# Patient Record
Sex: FEMALE | Race: WHITE | NOT HISPANIC OR LATINO | Employment: UNEMPLOYED | URBAN - METROPOLITAN AREA
[De-identification: names, ages, dates, MRNs, and addresses within clinical notes are randomized per-mention and may not be internally consistent; named-entity substitution may affect disease eponyms.]

---

## 2018-02-19 ENCOUNTER — HOSPITAL ENCOUNTER (EMERGENCY)
Facility: CLINIC | Age: 28
Discharge: HOME | End: 2018-02-19
Attending: PHYSICIAN ASSISTANT | Admitting: PHYSICIAN ASSISTANT
Payer: MEDICAID

## 2018-02-19 VITALS
TEMPERATURE: 98.5 F | BODY MASS INDEX: 20.3 KG/M2 | OXYGEN SATURATION: 94 % | DIASTOLIC BLOOD PRESSURE: 84 MMHG | HEART RATE: 80 BPM | WEIGHT: 122 LBS | RESPIRATION RATE: 14 BRPM | SYSTOLIC BLOOD PRESSURE: 130 MMHG

## 2018-02-19 DIAGNOSIS — H73.012 BULLOUS MYRINGITIS OF LEFT EAR: ICD-10-CM

## 2018-02-19 PROCEDURE — 99213 OFFICE O/P EST LOW 20 MIN: CPT | Performed by: PHYSICIAN ASSISTANT

## 2018-02-19 PROCEDURE — G0463 HOSPITAL OUTPT CLINIC VISIT: HCPCS

## 2018-02-19 RX ORDER — OFLOXACIN 3 MG/ML
10 SOLUTION AURICULAR (OTIC) DAILY
Qty: 4 ML | Refills: 0 | Status: SHIPPED | OUTPATIENT
Start: 2018-02-19 | End: 2018-02-26

## 2018-02-19 RX ORDER — AZITHROMYCIN 250 MG/1
TABLET, FILM COATED ORAL
Qty: 6 TABLET | Refills: 0 | Status: SHIPPED | OUTPATIENT
Start: 2018-02-19 | End: 2018-02-24

## 2018-02-19 RX ORDER — NAPROXEN 500 MG/1
500 TABLET ORAL 2 TIMES DAILY PRN
Qty: 16 TABLET | Refills: 0 | Status: SHIPPED | OUTPATIENT
Start: 2018-02-19 | End: 2019-12-29

## 2018-02-19 ASSESSMENT — ENCOUNTER SYMPTOMS
SORE THROAT: 1
COUGH: 0
RHINORRHEA: 1
WOUND: 0
WEAKNESS: 0
NUMBNESS: 0
DIZZINESS: 0
WHEEZING: 0
APPETITE CHANGE: 0
LIGHT-HEADEDNESS: 0
ABDOMINAL PAIN: 0
DIARRHEA: 0
VOMITING: 0
HEADACHES: 0
TROUBLE SWALLOWING: 0
FEVER: 0
ACTIVITY CHANGE: 0
VOICE CHANGE: 0
NAUSEA: 0

## 2018-02-19 NOTE — ED AVS SNAPSHOT
[facility] Emergency Department    [address] [address]    Phone:  [phone]    Fax:  [phone]                                       Harrison Raymond   MRN: [id number]    Department:  [facility] Emergency Department   Date of Visit:  2/19/2018           Patient Information     Date Of Birth          1990        Your diagnoses for this visit were:     Bullous myringitis of left ear        You were seen by Renae Cronin PA-C.      Follow-up Information     Follow up with No Ref-Primary, Physician.    Why:  As needed, If symptoms worsen        Discharge Instructions       Use medication as directed.    May use acetaminophen prn.  Follow up with PCP for recheck in 2 days, return sooner if symptoms worsen or change.    Increase fluids, rest, warm compress to ears.     Patient voiced understanding of instructions given.         24 Hour Appointment Hotline       To make an appointment at any [facility], call [phone] ([phone]). If you don't have a family doctor or clinic, we will help you find one. [place] clinics are conveniently located to serve the needs of you and your family.             Review of your medicines      START taking        Dose / Directions Last dose taken    azithromycin 250 MG tablet   Commonly known as:  ZITHROMAX Z-LENNOX   Quantity:  6 tablet        Two tablets on the first day, then one tablet daily for the next 4 days   Refills:  0        naproxen 500 MG tablet   Commonly known as:  NAPROSYN   Dose:  500 mg   Quantity:  16 tablet        Take 1 tablet (500 mg) by mouth 2 times daily as needed for moderate pain   Refills:  0        ofloxacin 0.3 % otic solution   Commonly known as:  FLOXIN   Dose:  10 drop   Quantity:  4 mL        Place 10 drops Into the left ear daily for 7 days   Refills:  0          Our records show that you are taking the medicines listed below. If these are incorrect, please call your family doctor or  clinic.        Dose / Directions Last dose taken    DEPO-PROVERA 150 MG/ML injection   Dose:  150 mg   Quantity:  0.9 mL   Generic drug:  medroxyPROGESTERone        Inject 1 mL (150 mg) into the muscle every 3 months   Refills:  9        ibuprofen 400-800 mg tablet   Commonly known as:  ADVIL,MOTRIN   Dose:  400-800 mg   Quantity:  90 tablet        Take 1-2 tablets (400-800 mg) by mouth every 6 hours as needed for other (cramping)   Refills:  0                Prescriptions were sent or printed at these locations (3 Prescriptions)                   [facility] [place], [place] - [address] [facility] [address]   [address] [address]    Telephone:  [phone]   Fax:  [phone]   Hours:                  E-Prescribed (2 of 3)         azithromycin (ZITHROMAX Z-LENNOX) 250 MG tablet               naproxen (NAPROSYN) 500 MG tablet                 Printed at Department/Unit printer (1 of 3)         ofloxacin (FLOXIN) 0.3 % otic solution                Orders Needing Specimen Collection     None      Pending Results     No orders found from 2/17/2018 to 2/20/2018.            Pending Culture Results     No orders found from 2/17/2018 to 2/20/2018.            Pending Results Instructions     If you had any lab results that were not finalized at the time of your Discharge, you can call the ED Lab Result RN at [phone]. You will be contacted by this team for any positive Lab results or changes in treatment. The nurses are available 7 days a week from 10A to 6:30P.  You can leave a message 24 hours per day and they will return your call.        Test Results From Your Hospital Stay               Thank you for choosing [facility]       Thank you for choosing [facility] for your care. Our goal is always to provide you with excellent care. Hearing back from our patients is one way we can continue to improve our services. Please take a few minutes to complete the written survey that you may receive in the mail after  "you visit with us. Thank you!        [employer] Information     [employer] lets you send messages to your doctor, view your test results, renew your prescriptions, schedule appointments and more. To sign up, go to [email or web address] . Click on \"Log in\" on the left side of the screen, which will take you to the Welcome page. Then click on \"Sign up Now\" on the right side of the page.     You will be asked to enter the access code listed below, as well as some personal information. Please follow the directions to create your username and password.     Your access code is: [email or web address]  Expires: 2018  6:12 PM     Your access code will  in 90 days. If you need help or a new code, please call your [place] clinic or [phone].        Care EveryWhere ID     This is your Care EveryWhere ID. This could be used by other organizations to access your [place] medical records  [id number]        Equal Access to Services     OMAR LUCIA : Hadii albert mcclelland hadasho Soargeliaali, waaxda luqadaha, qaybta kaalmada adeegyajosie, eveline siddiqui . So [facility] [phone].    ATENCIÓN: Si habla español, tiene a lima disposición servicios gratuitos de asistencia lingüística. Llame al [phone].    We comply with applicable federal civil rights laws and [place] laws. We do not discriminate on the basis of race, color, national origin, age, disability, sex, sexual orientation, or gender identity.            After Visit Summary       This is your record. Keep this with you and show to your community pharmacist(s) and doctor(s) at your next visit.                  "

## 2018-02-19 NOTE — ED AVS SNAPSHOT
[facility] Emergency Department    [address] [address]    Phone:  [phone]    Fax:  [phone]                                       Harrison Raymond   MRN: [id number]    Department:  [facility] Emergency Department   Date of Visit:  2/19/2018           After Visit Summary Signature Page     I have received my discharge instructions, and my questions have been answered. I have discussed any challenges I see with this plan with the nurse or doctor.    ..........................................................................................................................................  Patient/Patient Representative Signature      ..........................................................................................................................................  Patient Representative Print Name and Relationship to Patient    ..................................................               ................................................  Date                                            Time    ..........................................................................................................................................  Reviewed by Signature/Title    ...................................................              ..............................................  Date                                                            Time

## 2018-02-19 NOTE — LETTER
[facility] EMERGENCY DEPARTMENT  [address] [address]  Phone: [phone]  Fax: [phone]    February 19, 2018        Harrison Raymond  [address]  [place] [address]          To whom it may concern:    RE: Harrison Raymond    Patient was seen and treated today at our clinic.  Please excuse her from work today and patient can return to work tomorrow. Patient not to use head set in left ear for 7-10 days.     Please contact me for questions or concerns.      Sincerely,    Renae Cronin PA-C

## 2018-02-20 NOTE — DISCHARGE INSTRUCTIONS
Use medication as directed.    May use acetaminophen prn.  Follow up with PCP for recheck in 2 days, return sooner if symptoms worsen or change.    Increase fluids, rest, warm compress to ears.     Patient voiced understanding of instructions given.

## 2018-02-20 NOTE — ED PROVIDER NOTES
History     Chief Complaint   Patient presents with     Otalgia     HPI    Harrison Raymond is a 27 year old female who presents with left ear pain for 3 day(s).   Severity: mild   Additional symptoms include post-nasal drip and rhinorrhea.  Occasionally scratchy throat.   Patient states about 3 days ago she was wearing ear buds and she felt a little shock in her left ear. Patient states the ear buds did not work after that. Patient denies ringing in ears, drainage, headaches, dizziness, patient states pain was on and off in ear until today when it became constant. Patient states she was seen in [place] today but is here for a 2nd opinion. Patient states she was given prescription steroid and told to take ibuprofen and tylenol for pain.    History of recurrent otitis: No    Problem list, Medication list, Allergies, and Medical/Social/Surgical histories reviewed in [facility] and updated as appropriate.      Problem List:    Patient Active Problem List    Diagnosis Date Noted     Supervision of normal first pregnancy 10/10/2014     Priority: Medium     Tdap given 16/2015          Past Medical History:    Past Medical History:   Diagnosis Date     Chickenpox      Migraines        Past Surgical History:    Past Surgical History:   Procedure Laterality Date     NO HISTORY OF SURGERY         Family History:    Family History   Problem Relation Age of Onset     Family History Negative       Unknown/Adopted Father      unknown       Social History:  Marital Status:  Single [1]  Social History   Substance Use Topics     Smoking status: Never Smoker     Smokeless tobacco: Not on file     Alcohol use Yes      Comment: rare- quit with pregnancy        Medications:      azithromycin (ZITHROMAX Z-LENNOX) 250 MG tablet   ofloxacin (FLOXIN) 0.3 % otic solution   naproxen (NAPROSYN) 500 MG tablet   medroxyPROGESTERone (DEPO-PROVERA) 150 MG/ML injection   ibuprofen (ADVIL,MOTRIN) 400-800 mg tablet         Review of Systems    Constitutional: Negative for activity change, appetite change and fever.   HENT: Positive for congestion, ear pain, rhinorrhea and sore throat (occasional scratchy throat. ). Negative for ear discharge, trouble swallowing and voice change.         No ringing in ears or drainage from ear. No pain with touching the ear.    Respiratory: Negative for cough and wheezing.    Cardiovascular: Negative for chest pain.   Gastrointestinal: Negative for abdominal pain, diarrhea, nausea and vomiting.   Skin: Negative for rash and wound.   Neurological: Negative for dizziness, weakness, light-headedness, numbness and headaches.   All other systems reviewed and are negative.      Physical Exam   BP: 130/84  Pulse: 80  Temp: 98.5  F (36.9  C)  Resp: 14  Weight: 55.3 kg (122 lb)  SpO2: 94 %      Physical Exam     /84  Pulse 80  Temp 98.5  F (36.9  C)  Resp 14  Wt 55.3 kg (122 lb)  SpO2 94%  BMI 20.3 kg/m2   Right TM is normal: no effusions, no erythema, and normal landmarks     The Right auditory canal is normal and without drainage, edema or erythema  Left TM is bullae present, distorted light reflex, erythematous and no perforation noted to TM.   The Left auditory canal is normal and without drainage, edema or erythema  Oropharynx exam is normal: no lesions, erythema, adenopathy or exudate.  GENERAL: no acute distress  EYES: EOMI,  PERRL, conjunctiva clear  NECK: supple, non-tender to palpation, no adenopathy noted  RESP: lungs clear to auscultation - no rales, rhonchi or wheezes  SKIN: no suspicious lesions or rashes   CV: regular rates and rhythm, normal    No results found for this or any previous visit (from the past 24 hour(s)).    ED Course     ED Course     Procedures              Critical Care time:  none               Labs Ordered and Resulted from Time of ED Arrival Up to the Time of Departure from the ED - No data to display    Assessments & Plan (with Medical Decision Making)     I have reviewed the  nursing notes.    I have reviewed the findings, diagnosis, plan and need for follow up with the patient.       Discharge Medication List as of 2/19/2018  6:14 PM      START taking these medications    Details   azithromycin (ZITHROMAX Z-LENNOX) 250 MG tablet Two tablets on the first day, then one tablet daily for the next 4 days, Disp-6 tablet, R-0, E-Prescribe      ofloxacin (FLOXIN) 0.3 % otic solution Place 10 drops Into the left ear daily for 7 days, Disp-4 mL, R-0, Local Print      naproxen (NAPROSYN) 500 MG tablet Take 1 tablet (500 mg) by mouth 2 times daily as needed for moderate pain, Disp-16 tablet, R-0, E-Prescribe             Final diagnoses:   Bullous myringitis of left ear       2/19/2018   [facility] EMERGENCY DEPARTMENT     Renae Cronin PA-C  02/19/18 3289

## 2018-12-27 ENCOUNTER — HOSPITAL ENCOUNTER (EMERGENCY)
Facility: CLINIC | Age: 28
Discharge: HOME | End: 2018-12-27
Attending: EMERGENCY MEDICINE | Admitting: EMERGENCY MEDICINE
Payer: MEDICAID

## 2018-12-27 VITALS
DIASTOLIC BLOOD PRESSURE: 74 MMHG | OXYGEN SATURATION: 99 % | RESPIRATION RATE: 16 BRPM | SYSTOLIC BLOOD PRESSURE: 116 MMHG | TEMPERATURE: 98.5 F

## 2018-12-27 DIAGNOSIS — H65.92 OME (OTITIS MEDIA WITH EFFUSION), LEFT: ICD-10-CM

## 2018-12-27 PROCEDURE — 25000132 ZZH RX MED GY IP 250 OP 250 PS 637: Performed by: EMERGENCY MEDICINE

## 2018-12-27 PROCEDURE — 99283 EMERGENCY DEPT VISIT LOW MDM: CPT

## 2018-12-27 PROCEDURE — 99283 EMERGENCY DEPT VISIT LOW MDM: CPT | Mod: Z6 | Performed by: EMERGENCY MEDICINE

## 2018-12-27 RX ORDER — SUMATRIPTAN 5 MG/1
1 SPRAY NASAL
COMMUNITY
Start: 2015-07-24 | End: 2019-12-29

## 2018-12-27 RX ORDER — EPINEPHRINE 0.3 MG/.3ML
0.3 INJECTION SUBCUTANEOUS
COMMUNITY
Start: 2017-06-10

## 2018-12-27 RX ORDER — AZITHROMYCIN 250 MG/1
500 TABLET, FILM COATED ORAL ONCE
Status: COMPLETED | OUTPATIENT
Start: 2018-12-27 | End: 2018-12-27

## 2018-12-27 RX ORDER — AZITHROMYCIN 250 MG/1
TABLET, FILM COATED ORAL
Qty: 6 TABLET | Refills: 0 | Status: SHIPPED | OUTPATIENT
Start: 2018-12-27 | End: 2019-01-01

## 2018-12-27 RX ADMIN — AZITHROMYCIN 500 MG: 250 TABLET, FILM COATED ORAL at 03:36

## 2018-12-27 NOTE — ED AVS SNAPSHOT
[facility] Emergency Department  [address] [address]  Phone:  [phone]  Fax:  [phone]                                    Harrison Raymond   MRN: [id number]    Department:  [facility] Emergency Department   Date of Visit:  12/27/2018           After Visit Summary Signature Page    I have received my discharge instructions, and my questions have been answered. I have discussed any challenges I see with this plan with the nurse or doctor.    ..........................................................................................................................................  Patient/Patient Representative Signature      ..........................................................................................................................................  Patient Representative Print Name and Relationship to Patient    ..................................................               ................................................  Date                                   Time    ..........................................................................................................................................  Reviewed by Signature/Title    ...................................................              ..............................................  Date                                               Time          22EPIC Rev 08/18

## 2018-12-27 NOTE — ED TRIAGE NOTES
Pt presents with complaints of left ear pain/pressure X 24 hours.  Pt states that she has had on/off pressure in her ears and nose for the past year.  Has not been tested for allergies.  Has not taken any meds tonight for pain.  Last ibuprofen was 24 hours ago

## 2018-12-27 NOTE — ED PROVIDER NOTES
Chief Complaint:   Chief Complaint   Patient presents with     Otalgia     Left ear pressure X 24 hours         HPI:   Harrison Raymond is a 28 year old female presenting to the ED complaining of left pain that started 1 day(s) ago.  There is associated plugged sensation on left.  There is not associated sore throat, swollen glands, fever, cough, vomiting, diarrhea, headache, nausea and abdominal discomfort.  She has tried no meds without relief of symptoms.  Temperature not measured at home.    Medications:   Current Outpatient Medications   Medication Sig Dispense Refill     azithromycin (ZITHROMAX Z-LENNOX) 250 MG tablet Two tablets on the first day, then one tablet daily for the next 4 days 6 tablet 0     EPINEPHrine (EPIPEN/ADRENACLICK/OR ANY BX GENERIC EQUIV) 0.3 MG/0.3ML injection 2-pack Inject 0.3 mg into the muscle       SUMAtriptan (IMITREX) 5 MG/ACT nasal spray Spray 1 spray in nostril       ibuprofen (ADVIL,MOTRIN) 400-800 mg tablet Take 1-2 tablets (400-800 mg) by mouth every 6 hours as needed for other (cramping) 90 tablet 0     medroxyPROGESTERone (DEPO-PROVERA) 150 MG/ML injection Inject 1 mL (150 mg) into the muscle every 3 months 0.9 mL 9     naproxen (NAPROSYN) 500 MG tablet Take 1 tablet (500 mg) by mouth 2 times daily as needed for moderate pain 16 tablet 0       Allergies:   Allergies   Allergen Reactions     Bees Hives and Swelling     Norco [Hydrocodone-Acetaminophen]      headache     Amoxicillin Rash       Medications updated and reviewed.  Past, family and surgical history is updated and reviewed in the record.    Review of Systems:  Ears: see HPI  Nose: see HPI  Throat/Mouth:see HPI    Physical Exam:   /74   Temp 98.5  F (36.9  C) (Oral)   Resp 16   LMP 12/22/2018   SpO2 99%    General: healthy, alert and cooperative  Head: Normocephalic. No masses, lesions, tenderness or abnormalities  Eyes: negative  Ears: Left ear with bulging, and air-fluid level, with erythema  Nose:  normal  Mouth/Throat: normal  Lungs: no tachypnea, retractions or cyanosis    Assessment:  1. OME (otitis media with effusion), left Acute            Plan:   follow up as needed, acetomenophen, ibuprofen and antibiotic :  Azithromycin.  Reviewed previous records.  Patient with allergy to amoxicillin.  Other symptomatic therapy suggested:  acetaminophen, ibuprofen, antihistamine-decongestant of choice  Return to the ER with increased pain, fevers or any other concerns.    Condition on disposition: Stable           Bijan Arzate MD  12/27/18 9248

## 2019-12-29 ENCOUNTER — HOSPITAL ENCOUNTER (EMERGENCY)
Facility: CLINIC | Age: 29
Discharge: HOME | End: 2019-12-29
Attending: EMERGENCY MEDICINE | Admitting: EMERGENCY MEDICINE
Payer: COMMERCIAL

## 2019-12-29 VITALS
WEIGHT: 125 LBS | SYSTOLIC BLOOD PRESSURE: 142 MMHG | OXYGEN SATURATION: 100 % | RESPIRATION RATE: 16 BRPM | TEMPERATURE: 98.4 F | DIASTOLIC BLOOD PRESSURE: 88 MMHG | BODY MASS INDEX: 20.8 KG/M2 | HEART RATE: 79 BPM

## 2019-12-29 DIAGNOSIS — K04.01 ACUTE PULPITIS: ICD-10-CM

## 2019-12-29 PROCEDURE — 99284 EMERGENCY DEPT VISIT MOD MDM: CPT | Mod: Z6 | Performed by: EMERGENCY MEDICINE

## 2019-12-29 PROCEDURE — 99282 EMERGENCY DEPT VISIT SF MDM: CPT | Performed by: EMERGENCY MEDICINE

## 2019-12-29 RX ORDER — BUPIVACAINE HYDROCHLORIDE AND EPINEPHRINE 5; 5 MG/ML; UG/ML
INJECTION, SOLUTION PERINEURAL
Status: DISCONTINUED
Start: 2019-12-29 | End: 2019-12-29 | Stop reason: HOSPADM

## 2019-12-29 RX ORDER — LEVONORGESTREL AND ETHINYL ESTRADIOL 0.15-0.03
1 KIT ORAL EVERY MORNING
COMMUNITY
Start: 2019-09-05

## 2019-12-29 RX ORDER — BUPIVACAINE HYDROCHLORIDE AND EPINEPHRINE 5; 5 MG/ML; UG/ML
1.8 INJECTION, SOLUTION PERINEURAL ONCE
Status: DISCONTINUED | OUTPATIENT
Start: 2019-12-29 | End: 2019-12-29 | Stop reason: HOSPADM

## 2019-12-29 RX ORDER — FLUTICASONE PROPIONATE 50 MCG
2 SPRAY, SUSPENSION (ML) NASAL DAILY
COMMUNITY
Start: 2019-12-27

## 2019-12-29 RX ORDER — CLINDAMYCIN HCL 300 MG
300 CAPSULE ORAL 4 TIMES DAILY
Qty: 40 CAPSULE | Refills: 0 | Status: SHIPPED | OUTPATIENT
Start: 2019-12-29 | End: 2020-02-02

## 2019-12-29 RX ORDER — FAMOTIDINE 40 MG/1
40 TABLET, FILM COATED ORAL AT BEDTIME
COMMUNITY
Start: 2019-12-27 | End: 2020-02-02

## 2019-12-29 RX ORDER — OXYCODONE HYDROCHLORIDE 5 MG/1
5 TABLET ORAL EVERY 6 HOURS PRN
Qty: 12 TABLET | Refills: 0 | Status: SHIPPED | OUTPATIENT
Start: 2019-12-29 | End: 2020-02-02

## 2019-12-29 NOTE — DISCHARGE INSTRUCTIONS
Return to the emergency department if you have fevers, increased swelling, worsening pain, difficulty breathing, or difficulty swallowing.  Otherwise follow-up with your dentist.    I do not believe that you have an infection in your tooth right now, however if you do start to have worsening you can fill the prescription for the antibiotics and start taking it then.    Use ibuprofen and acetaminophen for your symptoms.  Use oxycodone as needed for pain that is not controlled by the prior two medications.    Oxycodone is an addictive opioid medication, please only take it when the pain is more than can be controlled by acetaminophen or ibuprofen alone. It will also make you lightheaded, nauseated, and constipated.  Do not drive, operate heavy machinery, or take care of young children while taking this medication.     Repeated studies have shown that the longer you use opioid pain medications, the longer it is until you return to normal function. It is our recommendation that you taper off opioids as quickly as possible with the goal of returning to normal function or near normal function. Long term use of opioids quickly results in growing tolerance to the medication (less of the benefits) and increased dependence (more of the bad side effects).     Pain is very difficult to treat and we can very rarely take away pain completely. If you are having difficulty with pain over several weeks after an injury, you may need to start different medications and therapies (such as physical therapy, graded exercise, massage, and acupuncture). Please talk about this with your regular doctor.     If you are interested in seeking free, confidential treatment referral and information service for individuals and families facing mental health and/or substance use disorders please call [phone])

## 2019-12-29 NOTE — ED AVS SNAPSHOT
[facility] Emergency Department  [address] [address]  Phone:  [phone]  Fax:  [phone]                                    Harrison Raymond   MRN: [id number]    Department:  [facility] Emergency Department   Date of Visit:  12/29/2019           After Visit Summary Signature Page    I have received my discharge instructions, and my questions have been answered. I have discussed any challenges I see with this plan with the nurse or doctor.    ..........................................................................................................................................  Patient/Patient Representative Signature      ..........................................................................................................................................  Patient Representative Print Name and Relationship to Patient    ..................................................               ................................................  Date                                   Time    ..........................................................................................................................................  Reviewed by Signature/Title    ...................................................              ..............................................  Date                                               Time          22EPIC Rev 08/18

## 2019-12-29 NOTE — ED PROVIDER NOTES
History     Chief Complaint   Patient presents with     Dental Pain     R sided dental pain, out of abx and pain meds, has an appointment on 1/17 with dentist     RUTH  Harrison Raymond is a 29 year old female who presents for tooth pain.  Pain present for two days.  Localized to right lower jaw, throbbing, rated as severe, radiates to the ear.  She does not have associated facial swelling, difficulty swallowing, or fever.  She does have hx of dental issues; multiple prior tooth impactions and impaction.  Has taken acetaminophen and ibuprofen for pain with minimal relief.  She does have dental follow up arranged.  No other complaints.      Allergies:  Allergies   Allergen Reactions     Bees Hives and Swelling     Norco [Hydrocodone-Acetaminophen]      headache     Amoxicillin Rash       Problem List:    Patient Active Problem List    Diagnosis Date Noted     Supervision of normal first pregnancy 10/10/2014     Priority: Medium     Tdap given 16/2015          Past Medical History:    Past Medical History:   Diagnosis Date     Chickenpox      Migraines        Past Surgical History:    Past Surgical History:   Procedure Laterality Date     NO HISTORY OF SURGERY         Family History:    Family History   Problem Relation Age of Onset     Family History Negative Other      Unknown/Adopted Father         unknown       Social History:  Marital Status:  Single [1]  Social History     Tobacco Use     Smoking status: Current Every Day Smoker     Packs/day: 0.50     Smokeless tobacco: Never Used   Substance Use Topics     Alcohol use: Yes     Comment: rare- quit with pregnancy     Drug use: No        Medications:    clindamycin (CLEOCIN) 300 MG capsule  oxyCODONE (ROXICODONE) 5 MG tablet  EPINEPHrine (EPIPEN/ADRENACLICK/OR ANY BX GENERIC EQUIV) 0.3 MG/0.3ML injection 2-pack  ibuprofen (ADVIL,MOTRIN) 400-800 mg tablet  medroxyPROGESTERone (DEPO-PROVERA) 150 MG/ML injection  naproxen (NAPROSYN) 500 MG tablet  SUMAtriptan  (IMITREX) 5 MG/ACT nasal spray          Review of Systems  A 4 point review of systems was performed. All pertinent positives and negatives were listed in the HPI and rest of ROS were otherwise negative.    Physical Exam   BP: (!) 142/88  Pulse: 79  Temp: 98.4  F (36.9  C)  Resp: 16  Weight: 56.7 kg (125 lb)  SpO2: 100 %      Physical Exam  Constitutional:       General: She is not in acute distress.     Appearance: She is well-developed. She is not diaphoretic.   HENT:      Head: Normocephalic and atraumatic.      Right Ear: Tympanic membrane and ear canal normal.      Left Ear: Tympanic membrane and ear canal normal.      Mouth/Throat:      Mouth: No injury, oral lesions or angioedema.      Dentition: No gingival swelling, dental caries or dental abscesses.      Tonsils: Swellin+ on the right. 1+ on the left.   Eyes:      General: No scleral icterus.  Neck:      Musculoskeletal: Normal range of motion and neck supple.   Skin:     General: Skin is warm and dry.      Coloration: Skin is not pale.      Findings: No erythema or rash.   Neurological:      Mental Status: She is alert and oriented to person, place, and time.         ED Course        Procedures               Critical Care time:  none               No results found for this or any previous visit (from the past 24 hour(s)).    Medications   bupivacaine 0.5 % EPINEPHrine 1:200,000 0.5% -1:[id number] dental injection SOLN 1.8 mL (has no administration in time range)   bupivacaine 0.5 % EPINEPHrine 1:200,000 0.5% -1:[id number] dental injection SOLN (has no administration in time range)       Assessments & Plan (with Medical Decision Making)   Differential includes tooth eruption, pericoronitis, dental caries, pulpitis, periradicular periodontitis, facial cellulitis, post extraction alveolar osteitis, periodontal abscess, acute necrotizing ulcerative gingivitis. The patient's examination is most consistent with acute pulpitis. Nerve block declined by the patient.  The patient's symptoms were controlled and they were discharged with prescriptions for oxycodone and instructions to follow up in dental clinic as well as a wait and see prescription for clindamycin. The patient was in agreement with this plan.    I have reviewed the nursing notes.    I have reviewed the findings, diagnosis, plan and need for follow up with the patient.       New Prescriptions    CLINDAMYCIN (CLEOCIN) 300 MG CAPSULE    Take 1 capsule (300 mg) by mouth 4 times daily for 10 days    OXYCODONE (ROXICODONE) 5 MG TABLET    Take 1 tablet (5 mg) by mouth every 6 hours as needed for pain       Final diagnoses:   Acute pulpitis       12/29/2019   [facility] EMERGENCY DEPARTMENT     Sanju Blackamn MD  12/29/19 [id number]

## 2019-12-29 NOTE — ED NOTES
Pt here with right jaw/tooth pain pt states it is upper and lower. Hx of infections. Appointment 1/17/20 to have wisdom teeth removed. Pain started 1 week ago. Denies fever. Yesterday ibuprofen and tylenol stopped helping; she cannot sleep.

## 2020-01-18 ENCOUNTER — HOSPITAL ENCOUNTER (EMERGENCY)
Facility: CLINIC | Age: 30
Discharge: HOME | End: 2020-01-19
Attending: EMERGENCY MEDICINE | Admitting: EMERGENCY MEDICINE
Payer: COMMERCIAL

## 2020-01-18 ENCOUNTER — APPOINTMENT (OUTPATIENT)
Dept: GENERAL RADIOLOGY | Facility: CLINIC | Age: 30
End: 2020-01-18
Attending: EMERGENCY MEDICINE
Payer: COMMERCIAL

## 2020-01-18 VITALS
SYSTOLIC BLOOD PRESSURE: 106 MMHG | TEMPERATURE: 96.7 F | OXYGEN SATURATION: 96 % | WEIGHT: 125 LBS | BODY MASS INDEX: 20.8 KG/M2 | DIASTOLIC BLOOD PRESSURE: 63 MMHG

## 2020-01-18 DIAGNOSIS — K29.00 ACUTE GASTRITIS WITHOUT HEMORRHAGE, UNSPECIFIED GASTRITIS TYPE: ICD-10-CM

## 2020-01-18 DIAGNOSIS — R10.13 ABDOMINAL PAIN, EPIGASTRIC: ICD-10-CM

## 2020-01-18 DIAGNOSIS — R11.2 NON-INTRACTABLE VOMITING WITH NAUSEA, UNSPECIFIED VOMITING TYPE: ICD-10-CM

## 2020-01-18 LAB
ALBUMIN SERPL-MCNC: 3.3 G/DL (ref 3.4–5)
ALP SERPL-CCNC: 65 U/L (ref 40–150)
ALT SERPL W P-5'-P-CCNC: 25 U/L (ref 0–50)
ANION GAP SERPL CALCULATED.3IONS-SCNC: 4 MMOL/L (ref 3–14)
AST SERPL W P-5'-P-CCNC: 21 U/L (ref 0–45)
BASOPHILS # BLD AUTO: 0 10E9/L (ref 0–0.2)
BASOPHILS NFR BLD AUTO: 0.3 %
BILIRUB DIRECT SERPL-MCNC: 0.2 MG/DL (ref 0–0.2)
BILIRUB SERPL-MCNC: 0.7 MG/DL (ref 0.2–1.3)
BUN SERPL-MCNC: 11 MG/DL (ref 7–30)
CALCIUM SERPL-MCNC: 8.1 MG/DL (ref 8.5–10.1)
CHLORIDE SERPL-SCNC: 111 MMOL/L (ref 94–109)
CO2 SERPL-SCNC: 26 MMOL/L (ref 20–32)
CREAT SERPL-MCNC: 0.64 MG/DL (ref 0.52–1.04)
DIFFERENTIAL METHOD BLD: ABNORMAL
EOSINOPHIL # BLD AUTO: 0 10E9/L (ref 0–0.7)
EOSINOPHIL NFR BLD AUTO: 0.1 %
ERYTHROCYTE [DISTWIDTH] IN BLOOD BY AUTOMATED COUNT: 12.7 % (ref 10–15)
GFR SERPL CREATININE-BSD FRML MDRD: >90 ML/MIN/{1.73_M2}
GLUCOSE SERPL-MCNC: 103 MG/DL (ref 70–99)
HCG SERPL QL: NEGATIVE
HCT VFR BLD AUTO: 37.7 % (ref 35–47)
HGB BLD-MCNC: 12.1 G/DL (ref 11.7–15.7)
IMM GRANULOCYTES # BLD: 0 10E9/L (ref 0–0.4)
IMM GRANULOCYTES NFR BLD: 0.3 %
LIPASE SERPL-CCNC: 137 U/L (ref 73–393)
LYMPHOCYTES # BLD AUTO: 0.7 10E9/L (ref 0.8–5.3)
LYMPHOCYTES NFR BLD AUTO: 7.2 %
MCH RBC QN AUTO: 29.5 PG (ref 26.5–33)
MCHC RBC AUTO-ENTMCNC: 32.1 G/DL (ref 31.5–36.5)
MCV RBC AUTO: 92 FL (ref 78–100)
MONOCYTES # BLD AUTO: 0.4 10E9/L (ref 0–1.3)
MONOCYTES NFR BLD AUTO: 4.2 %
NEUTROPHILS # BLD AUTO: 8.8 10E9/L (ref 1.6–8.3)
NEUTROPHILS NFR BLD AUTO: 87.9 %
NRBC # BLD AUTO: 0 10*3/UL
NRBC BLD AUTO-RTO: 0 /100
PLATELET # BLD AUTO: 168 10E9/L (ref 150–450)
POTASSIUM SERPL-SCNC: 3.6 MMOL/L (ref 3.4–5.3)
PROT SERPL-MCNC: 6.8 G/DL (ref 6.8–8.8)
RBC # BLD AUTO: 4.1 10E12/L (ref 3.8–5.2)
SODIUM SERPL-SCNC: 141 MMOL/L (ref 133–144)
WBC # BLD AUTO: 10 10E9/L (ref 4–11)

## 2020-01-18 PROCEDURE — 99284 EMERGENCY DEPT VISIT MOD MDM: CPT | Mod: Z6 | Performed by: EMERGENCY MEDICINE

## 2020-01-18 PROCEDURE — 96374 THER/PROPH/DIAG INJ IV PUSH: CPT

## 2020-01-18 PROCEDURE — 96375 TX/PRO/DX INJ NEW DRUG ADDON: CPT

## 2020-01-18 PROCEDURE — 85025 COMPLETE CBC W/AUTO DIFF WBC: CPT | Performed by: EMERGENCY MEDICINE

## 2020-01-18 PROCEDURE — 25000128 H RX IP 250 OP 636

## 2020-01-18 PROCEDURE — 83690 ASSAY OF LIPASE: CPT | Performed by: EMERGENCY MEDICINE

## 2020-01-18 PROCEDURE — 74019 RADEX ABDOMEN 2 VIEWS: CPT

## 2020-01-18 PROCEDURE — 80048 BASIC METABOLIC PNL TOTAL CA: CPT | Performed by: EMERGENCY MEDICINE

## 2020-01-18 PROCEDURE — 96361 HYDRATE IV INFUSION ADD-ON: CPT

## 2020-01-18 PROCEDURE — 25000128 H RX IP 250 OP 636: Performed by: EMERGENCY MEDICINE

## 2020-01-18 PROCEDURE — 99284 EMERGENCY DEPT VISIT MOD MDM: CPT | Mod: 25

## 2020-01-18 PROCEDURE — 84703 CHORIONIC GONADOTROPIN ASSAY: CPT | Performed by: EMERGENCY MEDICINE

## 2020-01-18 PROCEDURE — 25800030 ZZH RX IP 258 OP 636: Performed by: EMERGENCY MEDICINE

## 2020-01-18 PROCEDURE — 80076 HEPATIC FUNCTION PANEL: CPT | Performed by: EMERGENCY MEDICINE

## 2020-01-18 PROCEDURE — C9113 INJ PANTOPRAZOLE SODIUM, VIA: HCPCS | Performed by: EMERGENCY MEDICINE

## 2020-01-18 PROCEDURE — 25000125 ZZHC RX 250: Performed by: EMERGENCY MEDICINE

## 2020-01-18 PROCEDURE — 25000132 ZZH RX MED GY IP 250 OP 250 PS 637: Performed by: EMERGENCY MEDICINE

## 2020-01-18 RX ORDER — ONDANSETRON 4 MG/1
4 TABLET, ORALLY DISINTEGRATING ORAL ONCE
Status: COMPLETED | OUTPATIENT
Start: 2020-01-18 | End: 2020-01-18

## 2020-01-18 RX ORDER — ONDANSETRON 2 MG/ML
4 INJECTION INTRAMUSCULAR; INTRAVENOUS ONCE
Status: COMPLETED | OUTPATIENT
Start: 2020-01-18 | End: 2020-01-18

## 2020-01-18 RX ORDER — ONDANSETRON 2 MG/ML
INJECTION INTRAMUSCULAR; INTRAVENOUS
Status: COMPLETED
Start: 2020-01-18 | End: 2020-01-18

## 2020-01-18 RX ORDER — FAMOTIDINE 10 MG
10 TABLET ORAL 2 TIMES DAILY
Qty: 28 TABLET | Refills: 0 | Status: SHIPPED | OUTPATIENT
Start: 2020-01-18 | End: 2020-02-02

## 2020-01-18 RX ORDER — ONDANSETRON 4 MG/1
4 TABLET, ORALLY DISINTEGRATING ORAL EVERY 8 HOURS PRN
Qty: 12 TABLET | Refills: 0 | Status: SHIPPED | OUTPATIENT
Start: 2020-01-18

## 2020-01-18 RX ORDER — HYDROMORPHONE HYDROCHLORIDE 1 MG/ML
0.5 INJECTION, SOLUTION INTRAMUSCULAR; INTRAVENOUS; SUBCUTANEOUS
Status: COMPLETED | OUTPATIENT
Start: 2020-01-18 | End: 2020-01-18

## 2020-01-18 RX ADMIN — PANTOPRAZOLE SODIUM 40 MG: 40 INJECTION, POWDER, LYOPHILIZED, FOR SOLUTION INTRAVENOUS at 23:10

## 2020-01-18 RX ADMIN — SODIUM CHLORIDE 1000 ML: 9 INJECTION, SOLUTION INTRAVENOUS at 19:39

## 2020-01-18 RX ADMIN — ONDANSETRON 4 MG: 4 TABLET, ORALLY DISINTEGRATING ORAL at 18:38

## 2020-01-18 RX ADMIN — ONDANSETRON 4 MG: 2 INJECTION INTRAMUSCULAR; INTRAVENOUS at 19:38

## 2020-01-18 RX ADMIN — HYDROMORPHONE HYDROCHLORIDE 0.5 MG: 1 INJECTION, SOLUTION INTRAMUSCULAR; INTRAVENOUS; SUBCUTANEOUS at 23:01

## 2020-01-18 RX ADMIN — LIDOCAINE HYDROCHLORIDE 30 ML: 20 SOLUTION ORAL; TOPICAL at 20:35

## 2020-01-18 RX ADMIN — ONDANSETRON 4 MG: 4 TABLET, ORALLY DISINTEGRATING ORAL at 22:14

## 2020-01-18 NOTE — ED AVS SNAPSHOT
[facility] Emergency Department  [address] [address]  Phone:  [phone]  Fax:  [phone]                                    Harrison Raymond   MRN: [id number]    Department:  [facility] Emergency Department   Date of Visit:  1/18/2020           After Visit Summary Signature Page    I have received my discharge instructions, and my questions have been answered. I have discussed any challenges I see with this plan with the nurse or doctor.    ..........................................................................................................................................  Patient/Patient Representative Signature      ..........................................................................................................................................  Patient Representative Print Name and Relationship to Patient    ..................................................               ................................................  Date                                   Time    ..........................................................................................................................................  Reviewed by Signature/Title    ...................................................              ..............................................  Date                                               Time          22EPIC Rev 08/18

## 2020-01-19 ASSESSMENT — ENCOUNTER SYMPTOMS
COUGH: 0
NECK PAIN: 0
HEADACHES: 0
ABDOMINAL PAIN: 1
VOMITING: 1
NAUSEA: 1
VOICE CHANGE: 0
CONSTIPATION: 0
SHORTNESS OF BREATH: 0
NECK STIFFNESS: 0
DYSURIA: 0
BRUISES/BLEEDS EASILY: 0
FEVER: 0
FREQUENCY: 0
BACK PAIN: 0
SORE THROAT: 0
DIARRHEA: 0
CHILLS: 0
TROUBLE SWALLOWING: 0

## 2020-01-19 NOTE — ED NOTES
Pt ready to discontinue to home, and then called and stated she was nauseated again.  Pt drank and entire glass of water, despite being instructed to only take 2 ice chips every 5 min. Or 1 tbls of water q 5 min.        MD aware.  IV has been pulled.  Pt states she will try ODT again.

## 2020-01-19 NOTE — ED NOTES
Pt resting comfortably, no nausea, still with epigastric pain/burning.  Pt given ice chips and directions to taken go slow.  Pt does not wish GI Cocktail at this time, wants to wait and see how ice chips do.  PLAN:  Will reassess.

## 2020-01-19 NOTE — ED NOTES
18g IV was started in L AC.  Labs drawn and sent to lab.  Pt medicated for comfort per MD order, and IV portonix  Now infusing.  Pt pregnancy test negative, so she has now been sent to Radiology for Abd exam.  Pt is feeling much better, and is resting comfortably.  PLAN:  Will await results and disposition.

## 2020-01-19 NOTE — ED NOTES
Pt arrived via triage, in WC., with emesis bag in hand.  Pt states she started vomiting this morning and hasn't been able to keep anything down.  Pt had her wisdom teeth pulled yesterday, and is on  medication for pain.  Pt states the sites have not been bleeding, and she did not swallow a lot of blood.  Pt feels it is medication related.  She states last night she stood up and she 'passed out' in her friends arms, who 'guided' her down.  Pt feels she was dehydrated then too.       ODT zofran given.   Pt resting, will reassess in 15-20min.    PLAN:  Reassess pt, await MD assessment and orders.

## 2020-01-19 NOTE — ED NOTES
"Reassessed pt, and she was actively vomiting.  Pt c/o burning abd pain.   Last Ibuprofen 600 mg taken on empty stomach at noon. Pt reports having GI 'problems\" and already thinks she has an \"ulcer\".      20g IV started and 1 lt NS hung, IV zofran given.      MD in room for assessment and pt state [place] teeth pulled due to infection, and she is also on antibiotics, which she hasn't had yet today.    PLAN:   Draw labs. Will reassess, give GI Cocktail if needed and pt not nauseated.           "

## 2020-01-19 NOTE — DISCHARGE INSTRUCTIONS
Take ondansetron (Zofran) for nausea and famotidine for abdominal pain as directed. Drink small amount of liquids frequently to maintain hydration. It will be important to take your medications with food. Follow up with your dentist as needed. You should see your primary care physician later this week for a follow up visit. If your symptoms worsen, or you develop new or concerning symptoms, please return to the Emergency Department for further evaluation and treatment.

## 2020-01-19 NOTE — ED PROVIDER NOTES
History     Chief Complaint   Patient presents with     Post-op Problem     continuous nausea and vomiting, had wisdom teeth removed yesterday     HPI  Harrison Raymond is a 29 year old female with history of epigastric abdominal pain, dental caries, status post wisdom teeth extraction x4 yesterday who presents with 1 day of epigastric pain, nausea, and vomiting.  Patient is on clindamycin for dental infections and has been taking ibuprofen for pain control.  Has not eaten today and is taking medications empty stomach.  Chart review shows that she was previously prescribed famotidine for presumed gastritis however she was unable to fill prescription and has not been taking.  She denies fever, chills, and chest pain, shortness of breath, sore throat, headache, dysuria, diarrhea, constipation.    The patient's PMHx, Surgical Hx, Allergies, and Medications were all reviewed with the patient.    Allergies:  Allergies   Allergen Reactions     Bees Hives and Swelling     Norco [Hydrocodone-Acetaminophen] Other (See Comments)     headache     Amoxicillin Rash       Problem List:    Patient Active Problem List    Diagnosis Date Noted     Supervision of normal first pregnancy 10/10/2014     Priority: Medium     Tdap given 16/2015          Past Medical History:    Past Medical History:   Diagnosis Date     Chickenpox      Migraines        Past Surgical History:    Past Surgical History:   Procedure Laterality Date     NO HISTORY OF SURGERY         Family History:    Family History   Problem Relation Age of Onset     Family History Negative Other      Unknown/Adopted Father         unknown       Social History:  Marital Status:  Single [1]  Social History     Tobacco Use     Smoking status: Current Every Day Smoker     Packs/day: 0.50     Smokeless tobacco: Never Used   Substance Use Topics     Alcohol use: Yes     Comment: rare- quit with pregnancy     Drug use: No        Medications:    ondansetron (ZOFRAN-ODT) 4 MG  ODT tab  EPINEPHrine (EPIPEN/ADRENACLICK/OR ANY BX GENERIC EQUIV) 0.3 MG/0.3ML injection 2-pack  famotidine (PEPCID) 40 MG tablet  fluticasone (FLONASE) 50 MCG/ACT nasal spray  ibuprofen (ADVIL,MOTRIN) 400-800 mg tablet  levonorgestrel-ethinyl estradiol (NORDETTE) 0.15-30 MG-MCG tablet  oxyCODONE (ROXICODONE) 5 MG tablet          Review of Systems   Constitutional: Negative for chills and fever.   HENT: Positive for dental problem. Negative for drooling, sore throat, trouble swallowing and voice change.    Eyes: Negative for visual disturbance.   Respiratory: Negative for cough and shortness of breath.    Cardiovascular: Negative for chest pain.   Gastrointestinal: Positive for abdominal pain, nausea and vomiting. Negative for constipation and diarrhea.   Genitourinary: Negative for dysuria, frequency and urgency.   Musculoskeletal: Negative for back pain, neck pain and neck stiffness.   Skin: Negative for rash.   Allergic/Immunologic: Negative for immunocompromised state.   Neurological: Negative for headaches.   Hematological: Does not bruise/bleed easily.       Physical Exam   BP: 114/78  Heart Rate: 95  Temp: 96.7  F (35.9  C)  Weight: 56.7 kg (125 lb)  SpO2: 100 %    Physical Exam  GEN: Awake, alert, and cooperative.  Appears mildly distressed but nontoxic.  HENT: MMM. External ears and nose normal bilaterally.  Posterior pharynx without erythema, edema, or exudate.  No edema or purulent exudate surrounding wisdom teeth extraction sites.  No bleeding.  EYES: EOM intact. Conjunctiva clear.   NECK: Supple, symmetric.  Nontender  CV : Regular rate and rhythm.  Brisk capillary refill  PULM: Normal effort. No wheezes, rales, or rhonchi bilaterally.  ABD: Soft, nondistended, mild tenderness to epigastrium. No rebound or guarding.   NEURO: Normal speech. Following commands. CN II-XII grossly intact.   EXT: No gross deformity. Warm and well perfused  INT: Warm. No diaphoresis. Normal color.        ED Course         Procedures           Critical Care time:  none               Results for orders placed or performed during the hospital encounter of 01/18/20 (from the past 24 hour(s))   CBC with platelets differential   Result Value Ref Range    WBC 10.0 4.0 - 11.0 10e9/L    RBC Count 4.10 3.8 - 5.2 10e12/L    Hemoglobin 12.1 11.7 - 15.7 g/dL    Hematocrit 37.7 35.0 - 47.0 %    MCV 92 78 - 100 fl    MCH 29.5 26.5 - 33.0 pg    MCHC 32.1 31.5 - 36.5 g/dL    RDW 12.7 10.0 - 15.0 %    Platelet Count 168 150 - 450 10e9/L    Diff Method Automated Method     % Neutrophils 87.9 %    % Lymphocytes 7.2 %    % Monocytes 4.2 %    % Eosinophils 0.1 %    % Basophils 0.3 %    % Immature Granulocytes 0.3 %    Nucleated RBCs 0 0 /100    Absolute Neutrophil 8.8 (H) 1.6 - 8.3 10e9/L    Absolute Lymphocytes 0.7 (L) 0.8 - 5.3 10e9/L    Absolute Monocytes 0.4 0.0 - 1.3 10e9/L    Absolute Eosinophils 0.0 0.0 - 0.7 10e9/L    Absolute Basophils 0.0 0.0 - 0.2 10e9/L    Abs Immature Granulocytes 0.0 0 - 0.4 10e9/L    Absolute Nucleated RBC 0.0    Basic metabolic panel   Result Value Ref Range    Sodium 141 133 - 144 mmol/L    Potassium 3.6 3.4 - 5.3 mmol/L    Chloride 111 (H) 94 - 109 mmol/L    Carbon Dioxide 26 20 - 32 mmol/L    Anion Gap 4 3 - 14 mmol/L    Glucose 103 (H) 70 - 99 mg/dL    Urea Nitrogen 11 7 - 30 mg/dL    Creatinine 0.64 0.52 - 1.04 mg/dL    GFR Estimate >90 >60 mL/min/[1.73_m2]    GFR Estimate If Black >90 >60 mL/min/[1.73_m2]    Calcium 8.1 (L) 8.5 - 10.1 mg/dL       Medications   ondansetron (ZOFRAN-ODT) ODT tab 4 mg (4 mg Oral Given 1/18/20 1838)   lidocaine (XYLOCAINE) 2 % 15 mL, alum & mag hydroxide-simethicone (MYLANTA ES/MAALOX  ES) 15 mL GI Cocktail (30 mLs Oral Given 1/18/20 2035)   ondansetron (ZOFRAN) injection 4 mg (4 mg Intravenous Given 1/18/20 1938)   0.9% sodium chloride BOLUS (0 mLs Intravenous Stopped 1/18/20 2031)   ondansetron (ZOFRAN-ODT) ODT tab 4 mg (4 mg Oral Given 1/18/20 2214)       Assessments & Plan (with  "Medical Decision Making)   29 year old female with past medical history of dental caries status post within teeth extraction x4 yesterday, presumed gastritis, with 1 day of epigastric pain, nausea, and vomiting.  On arrival to Emergency Department, vital signs were within normal limits.  IV access obtained, labs sent.  Exam notable for mild epigastric tenderness no rebound or guarding.  CBC grossly normal.  BMP with normal electrolytes and renal function.  She was given 1 L of normal saline and IV ondansetron with modest improvement of her symptoms.  After nausea was under control she was given a GI cocktail with additional improvement of her pain and nausea.  She was able to take some ice chips and then eat a few saltine crackers.  Symptoms most likely secondary to gastritis given her prior history and taking antibiotics and pain medications and empty stomach.  Pain described as \"burning\".  this is also supported by her improvement with today's treatments.  Given that she is feeling improved, plan for discharge with primary care follow-up later this week and prescription sent to outpatient pharmacy for Zofran.  Just prior to discharge she had a return of her abdominal pain, nausea and vomiting.  Reports she drank a whole glass of water and symptoms started shortly afterwards.  Will broaden work-up to include LFTs, lipase, serum pregnancy, and plain films of abdomen to evaluate for perforation.  Patient is very anxious appearing and seems distressed.  Abdominal exam not significant change from earlier with mild epigastric tenderness but no peritoneal signs.  IV access reestablished and D5 half-normal saline infusing.  Hepatic panel with normal bilirubin, alk phos, and LFTs.  Lipase 137.  Will give 0.5 mg IV hydromorphone for pain control. Serum pregnancy negative. IV pantoprazole infusion. Pain and nausea improved. No hx of BRBPR, or melena. She is worried that she may have an ulcer. Abdominal XR with no free air " and non obstructive bowel gas pattern. Will send prescription for famotidine. Plan for close primary care follow up in2-3 days. Will return to ED if symptoms worsen. Will need to monitor treatment response to H2 blocker, consideration of H.Pylori testing and possible EGD if felt necessary. She expresses agreement and understanding plan.     I have reviewed the nursing notes.         New Prescriptions    ONDANSETRON (ZOFRAN-ODT) 4 MG ODT TAB    Take 1 tablet (4 mg) by mouth every 8 hours as needed for nausea       Final diagnoses:   Abdominal pain, epigastric   Non-intractable vomiting with nausea, unspecified vomiting type     Joseph Dumont MD    1/18/2020   [facility] EMERGENCY DEPARTMENT    Disclaimer: This note consists of words and symbols derived from keyboarding and dictation using voice recognition software.  As a result, there may be errors that have gone undetected.  Please consider this when interpreting information found in this note.             Joseph Dumont MD  01/19/20 0005

## 2020-01-20 ENCOUNTER — HOSPITAL ENCOUNTER (EMERGENCY)
Facility: CLINIC | Age: 30
Discharge: HOME | End: 2020-01-20
Attending: EMERGENCY MEDICINE | Admitting: EMERGENCY MEDICINE
Payer: COMMERCIAL

## 2020-01-20 VITALS
DIASTOLIC BLOOD PRESSURE: 51 MMHG | OXYGEN SATURATION: 97 % | HEIGHT: 65 IN | HEART RATE: 66 BPM | BODY MASS INDEX: 20.83 KG/M2 | SYSTOLIC BLOOD PRESSURE: 113 MMHG | TEMPERATURE: 97.9 F | WEIGHT: 125 LBS

## 2020-01-20 DIAGNOSIS — K21.00 GASTROESOPHAGEAL REFLUX DISEASE WITH ESOPHAGITIS: ICD-10-CM

## 2020-01-20 LAB
ALBUMIN SERPL-MCNC: 3.8 G/DL (ref 3.4–5)
ALP SERPL-CCNC: 67 U/L (ref 40–150)
ALT SERPL W P-5'-P-CCNC: 29 U/L (ref 0–50)
ANION GAP SERPL CALCULATED.3IONS-SCNC: 6 MMOL/L (ref 3–14)
AST SERPL W P-5'-P-CCNC: 23 U/L (ref 0–45)
BASOPHILS # BLD AUTO: 0 10E9/L (ref 0–0.2)
BASOPHILS NFR BLD AUTO: 0.3 %
BILIRUB SERPL-MCNC: 0.5 MG/DL (ref 0.2–1.3)
BUN SERPL-MCNC: 11 MG/DL (ref 7–30)
CALCIUM SERPL-MCNC: 9 MG/DL (ref 8.5–10.1)
CHLORIDE SERPL-SCNC: 107 MMOL/L (ref 94–109)
CO2 SERPL-SCNC: 26 MMOL/L (ref 20–32)
CREAT SERPL-MCNC: 0.67 MG/DL (ref 0.52–1.04)
DIFFERENTIAL METHOD BLD: NORMAL
EOSINOPHIL # BLD AUTO: 0.1 10E9/L (ref 0–0.7)
EOSINOPHIL NFR BLD AUTO: 1.1 %
ERYTHROCYTE [DISTWIDTH] IN BLOOD BY AUTOMATED COUNT: 12.6 % (ref 10–15)
GFR SERPL CREATININE-BSD FRML MDRD: >90 ML/MIN/{1.73_M2}
GLUCOSE SERPL-MCNC: 84 MG/DL (ref 70–99)
HCT VFR BLD AUTO: 41.3 % (ref 35–47)
HGB BLD-MCNC: 13.3 G/DL (ref 11.7–15.7)
IMM GRANULOCYTES # BLD: 0 10E9/L (ref 0–0.4)
IMM GRANULOCYTES NFR BLD: 0.2 %
LIPASE SERPL-CCNC: 155 U/L (ref 73–393)
LYMPHOCYTES # BLD AUTO: 2 10E9/L (ref 0.8–5.3)
LYMPHOCYTES NFR BLD AUTO: 32.8 %
MCH RBC QN AUTO: 29.4 PG (ref 26.5–33)
MCHC RBC AUTO-ENTMCNC: 32.2 G/DL (ref 31.5–36.5)
MCV RBC AUTO: 91 FL (ref 78–100)
MONOCYTES # BLD AUTO: 0.6 10E9/L (ref 0–1.3)
MONOCYTES NFR BLD AUTO: 8.9 %
NEUTROPHILS # BLD AUTO: 3.5 10E9/L (ref 1.6–8.3)
NEUTROPHILS NFR BLD AUTO: 56.7 %
NRBC # BLD AUTO: 0 10*3/UL
NRBC BLD AUTO-RTO: 0 /100
PLATELET # BLD AUTO: 182 10E9/L (ref 150–450)
POTASSIUM SERPL-SCNC: 3.9 MMOL/L (ref 3.4–5.3)
PROT SERPL-MCNC: 7.6 G/DL (ref 6.8–8.8)
RBC # BLD AUTO: 4.52 10E12/L (ref 3.8–5.2)
SODIUM SERPL-SCNC: 139 MMOL/L (ref 133–144)
TSH SERPL DL<=0.005 MIU/L-ACNC: 1.69 MU/L (ref 0.4–4)
WBC # BLD AUTO: 6.2 10E9/L (ref 4–11)

## 2020-01-20 PROCEDURE — 99284 EMERGENCY DEPT VISIT MOD MDM: CPT | Mod: Z6 | Performed by: EMERGENCY MEDICINE

## 2020-01-20 PROCEDURE — 85025 COMPLETE CBC W/AUTO DIFF WBC: CPT | Performed by: EMERGENCY MEDICINE

## 2020-01-20 PROCEDURE — 25800030 ZZH RX IP 258 OP 636: Performed by: EMERGENCY MEDICINE

## 2020-01-20 PROCEDURE — 99284 EMERGENCY DEPT VISIT MOD MDM: CPT | Mod: 25 | Performed by: EMERGENCY MEDICINE

## 2020-01-20 PROCEDURE — 96375 TX/PRO/DX INJ NEW DRUG ADDON: CPT | Performed by: EMERGENCY MEDICINE

## 2020-01-20 PROCEDURE — 96361 HYDRATE IV INFUSION ADD-ON: CPT | Performed by: EMERGENCY MEDICINE

## 2020-01-20 PROCEDURE — 84443 ASSAY THYROID STIM HORMONE: CPT | Performed by: EMERGENCY MEDICINE

## 2020-01-20 PROCEDURE — 80053 COMPREHEN METABOLIC PANEL: CPT | Performed by: EMERGENCY MEDICINE

## 2020-01-20 PROCEDURE — 83690 ASSAY OF LIPASE: CPT | Performed by: EMERGENCY MEDICINE

## 2020-01-20 PROCEDURE — 96374 THER/PROPH/DIAG INJ IV PUSH: CPT | Performed by: EMERGENCY MEDICINE

## 2020-01-20 PROCEDURE — 25000128 H RX IP 250 OP 636: Performed by: EMERGENCY MEDICINE

## 2020-01-20 RX ORDER — METOCLOPRAMIDE HYDROCHLORIDE 5 MG/ML
10 INJECTION INTRAMUSCULAR; INTRAVENOUS ONCE
Status: COMPLETED | OUTPATIENT
Start: 2020-01-20 | End: 2020-01-20

## 2020-01-20 RX ORDER — DIPHENHYDRAMINE HYDROCHLORIDE 50 MG/ML
25 INJECTION INTRAMUSCULAR; INTRAVENOUS ONCE
Status: COMPLETED | OUTPATIENT
Start: 2020-01-20 | End: 2020-01-20

## 2020-01-20 RX ORDER — SODIUM CHLORIDE 9 MG/ML
1000 INJECTION, SOLUTION INTRAVENOUS CONTINUOUS
Status: DISCONTINUED | OUTPATIENT
Start: 2020-01-20 | End: 2020-01-21 | Stop reason: HOSPADM

## 2020-01-20 RX ORDER — KETOROLAC TROMETHAMINE 15 MG/ML
15 INJECTION, SOLUTION INTRAMUSCULAR; INTRAVENOUS ONCE
Status: COMPLETED | OUTPATIENT
Start: 2020-01-20 | End: 2020-01-20

## 2020-01-20 RX ADMIN — KETOROLAC TROMETHAMINE 15 MG: 15 INJECTION, SOLUTION INTRAMUSCULAR; INTRAVENOUS at 21:49

## 2020-01-20 RX ADMIN — METOCLOPRAMIDE HYDROCHLORIDE 10 MG: 5 INJECTION INTRAMUSCULAR; INTRAVENOUS at 21:50

## 2020-01-20 RX ADMIN — SODIUM CHLORIDE 1000 ML: 9 INJECTION, SOLUTION INTRAVENOUS at 21:47

## 2020-01-20 RX ADMIN — DIPHENHYDRAMINE HYDROCHLORIDE 25 MG: 50 INJECTION, SOLUTION INTRAMUSCULAR; INTRAVENOUS at 21:47

## 2020-01-20 ASSESSMENT — MIFFLIN-ST. JEOR: SCORE: 1302.88

## 2020-01-20 NOTE — ED AVS SNAPSHOT
[facility] Emergency Department  [address] [address]  Phone:  [phone]  Fax:  [phone]                                    Harrison Raymond   MRN: [id number]    Department:  [facility] Emergency Department   Date of Visit:  1/20/2020           After Visit Summary Signature Page    I have received my discharge instructions, and my questions have been answered. I have discussed any challenges I see with this plan with the nurse or doctor.    ..........................................................................................................................................  Patient/Patient Representative Signature      ..........................................................................................................................................  Patient Representative Print Name and Relationship to Patient    ..................................................               ................................................  Date                                   Time    ..........................................................................................................................................  Reviewed by Signature/Title    ...................................................              ..............................................  Date                                               Time          22EPIC Rev 08/18

## 2020-01-21 NOTE — ED NOTES
Denies CP at this time and SOA.  Pt tolerated mashed potatoes and apple sauce today, without emesis.

## 2020-01-21 NOTE — DISCHARGE INSTRUCTIONS
Tylenol for discomfort, ondansetron for nausea    Advance diet as tolerated    Omeprazole 40 mg daily for the next 7 to 10 days

## 2020-01-21 NOTE — ED NOTES
"Seen in ED Saturday for n/v and dizziness.  Pt had wisdom teeth removed on Friday, \"has been pretty much dizzy since then.\"  Pt is no longer taking clindamycin, dentist advised to discontinue d/t inconsistency of taking them prior to surgery and abd pain after.  Pt taking tylenol for pain at home, 1g at 1400 today.  Pt has had decreased appetite, states that zofran at home has not helped.  Pt states that she is having sharp chest pains intermittently, and they do not last very long, approx <1 minute.  Pt in no distress at this time.  VSS.       "

## 2020-01-22 NOTE — ED PROVIDER NOTES
History     Chief Complaint   Patient presents with     Dizziness     dizziness, n/v, ha, cp on/off today, here 2 days with same     HPI  Harrison Raymond is a 29 year old female who presents with a constellation of complaints including headache, nausea vomiting without hematemesis coffee-ground emesis or biliary component, substernal chest discomfort described as a tightness that waxes and wanes, worse with oral intake, epigastric pain, chills and sweats no fever, no cough.  [place] teeth extracted 1/17.  Seen here on 1/18 with nausea see note in epic which is reviewed in its entirety.  Work-up is unremarkable.  She received IV fluids and home with antiemetic.    Allergies:  Allergies   Allergen Reactions     Bees Hives and Swelling     Norco [Hydrocodone-Acetaminophen] Other (See Comments)     headache     Amoxicillin Rash       Problem List:    Patient Active Problem List    Diagnosis Date Noted     Supervision of normal first pregnancy 10/10/2014     Priority: Medium     Tdap given 16/2015          Past Medical History:    Past Medical History:   Diagnosis Date     Chickenpox      Migraines        Past Surgical History:    Past Surgical History:   Procedure Laterality Date     NO HISTORY OF SURGERY         Family History:    Family History   Problem Relation Age of Onset     Family History Negative Other      Unknown/Adopted Father         unknown       Social History:  Marital Status:  Single [1]  Social History     Tobacco Use     Smoking status: Current Every Day Smoker     Packs/day: 0.50     Smokeless tobacco: Never Used   Substance Use Topics     Alcohol use: Yes     Comment: rare- quit with pregnancy     Drug use: No        Medications:    EPINEPHrine (EPIPEN/ADRENACLICK/OR ANY BX GENERIC EQUIV) 0.3 MG/0.3ML injection 2-pack  famotidine (PEPCID) 10 MG tablet  famotidine (PEPCID) 40 MG tablet  fluticasone (FLONASE) 50 MCG/ACT nasal spray  ibuprofen (ADVIL,MOTRIN) 400-800 mg  "tablet  levonorgestrel-ethinyl estradiol (NORDETTE) 0.15-30 MG-MCG tablet  ondansetron (ZOFRAN-ODT) 4 MG ODT tab  oxyCODONE (ROXICODONE) 5 MG tablet          Review of Systems  All other systems reviewed and are negative.    Physical Exam   BP: 122/82  Pulse: 67  Temp: 97.9  F (36.6  C)  Height: 165.1 cm (5' 5\")  Weight: 56.7 kg (125 lb)  SpO2: 98 %      Physical Exam  Nontoxic appearing no respiratory distress alert and oriented ×3  Head atraumatic normocephalic  TMs/EACs unremarkable, conjunctiva noninjected, oropharynx moist without lesions or erythema  No cervical adenopathy   Neck supple full active painless range of motion  Lungs clear to auscultation  Heart regular no murmur  Abdomen soft mild epigastric tenderness without guarding or rebound bowel sounds positive  Strength and sensation grossly intact throughout the extremities, gait and station normal  Speech is fluent, good eye contact, thought processes are rational  Lower extremities without swelling, redness or tenderness  Pedal pulses symmetrical and strong    ED Course        Procedures               Critical Care time:  none               Results for orders placed or performed during the hospital encounter of 01/20/20 (from the past 24 hour(s))   CBC with platelets differential   Result Value Ref Range    WBC 6.2 4.0 - 11.0 10e9/L    RBC Count 4.52 3.8 - 5.2 10e12/L    Hemoglobin 13.3 11.7 - 15.7 g/dL    Hematocrit 41.3 35.0 - 47.0 %    MCV 91 78 - 100 fl    MCH 29.4 26.5 - 33.0 pg    MCHC 32.2 31.5 - 36.5 g/dL    RDW 12.6 10.0 - 15.0 %    Platelet Count 182 150 - 450 10e9/L    Diff Method Automated Method     % Neutrophils 56.7 %    % Lymphocytes 32.8 %    % Monocytes 8.9 %    % Eosinophils 1.1 %    % Basophils 0.3 %    % Immature Granulocytes 0.2 %    Nucleated RBCs 0 0 /100    Absolute Neutrophil 3.5 1.6 - 8.3 10e9/L    Absolute Lymphocytes 2.0 0.8 - 5.3 10e9/L    Absolute Monocytes 0.6 0.0 - 1.3 10e9/L    Absolute Eosinophils 0.1 0.0 - 0.7 10e9/L "    Absolute Basophils 0.0 0.0 - 0.2 10e9/L    Abs Immature Granulocytes 0.0 0 - 0.4 10e9/L    Absolute Nucleated RBC 0.0    Comprehensive metabolic panel   Result Value Ref Range    Sodium 139 133 - 144 mmol/L    Potassium 3.9 3.4 - 5.3 mmol/L    Chloride 107 94 - 109 mmol/L    Carbon Dioxide 26 20 - 32 mmol/L    Anion Gap 6 3 - 14 mmol/L    Glucose 84 70 - 99 mg/dL    Urea Nitrogen 11 7 - 30 mg/dL    Creatinine 0.67 0.52 - 1.04 mg/dL    GFR Estimate >90 >60 mL/min/[1.73_m2]    GFR Estimate If Black >90 >60 mL/min/[1.73_m2]    Calcium 9.0 8.5 - 10.1 mg/dL    Bilirubin Total 0.5 0.2 - 1.3 mg/dL    Albumin 3.8 3.4 - 5.0 g/dL    Protein Total 7.6 6.8 - 8.8 g/dL    Alkaline Phosphatase 67 40 - 150 U/L    ALT 29 0 - 50 U/L    AST 23 0 - 45 U/L   Lipase   Result Value Ref Range    Lipase 155 73 - 393 U/L   TSH   Result Value Ref Range    TSH 1.69 0.40 - 4.00 mU/L       Medications   0.9% sodium chloride BOLUS (0 mLs Intravenous Stopped 1/20/20 2351)   diphenhydrAMINE (BENADRYL) injection 25 mg (25 mg Intravenous Given 1/20/20 2147)   ketorolac (TORADOL) injection 15 mg (15 mg Intravenous Given 1/20/20 2149)   metoclopramide (REGLAN) injection 10 mg (10 mg Intravenous Given 1/20/20 2150)       Assessments & Plan (with Medical Decision Making)  29-year-old female 3 days out dental extractions, initially developed symptoms while taking clindamycin and ibuprofen without eating.  Seen on 1/18 work-up unremarkable.  Returns with substernal chest pain consistent with esophageal spasm.  Headache with history of migraine, no focal neurologic complaint or finding no red flags with respect to headache.  Treated with above meds and fluid, subjectively feeling much better tolerating oral intake without nausea or vomiting or significant chest or epigastric pain.  No indication for further evaluation.  Advance diet as tolerated.  Continue ondansetron as needed.  Repeat lab work-up unremarkable     I have reviewed the nursing notes.    I  have reviewed the findings, diagnosis, plan and need for follow up with the patient.          Discharge Medication List as of 1/20/2020 11:51 PM          Final diagnoses:   Gastroesophageal reflux disease with esophagitis       1/20/2020   [facility] EMERGENCY DEPARTMENT     Frank Camargo MD  01/21/20 2054

## 2020-02-02 ENCOUNTER — HOSPITAL ENCOUNTER (EMERGENCY)
Facility: CLINIC | Age: 30
Discharge: HOME | End: 2020-02-02
Attending: FAMILY MEDICINE | Admitting: FAMILY MEDICINE
Payer: COMMERCIAL

## 2020-02-02 VITALS
HEIGHT: 65 IN | RESPIRATION RATE: 18 BRPM | WEIGHT: 135 LBS | SYSTOLIC BLOOD PRESSURE: 105 MMHG | OXYGEN SATURATION: 99 % | BODY MASS INDEX: 22.49 KG/M2 | DIASTOLIC BLOOD PRESSURE: 70 MMHG | TEMPERATURE: 98.9 F

## 2020-02-02 DIAGNOSIS — K29.20 ACUTE ALCOHOLIC GASTRITIS WITHOUT HEMORRHAGE: ICD-10-CM

## 2020-02-02 LAB
ALBUMIN SERPL-MCNC: 4.2 G/DL (ref 3.4–5)
ALP SERPL-CCNC: 68 U/L (ref 40–150)
ALT SERPL W P-5'-P-CCNC: 24 U/L (ref 0–50)
ANION GAP SERPL CALCULATED.3IONS-SCNC: 11 MMOL/L (ref 3–14)
AST SERPL W P-5'-P-CCNC: 16 U/L (ref 0–45)
BASOPHILS # BLD AUTO: 0 10E9/L (ref 0–0.2)
BASOPHILS NFR BLD AUTO: 0.3 %
BILIRUB SERPL-MCNC: 0.4 MG/DL (ref 0.2–1.3)
BUN SERPL-MCNC: 9 MG/DL (ref 7–30)
CALCIUM SERPL-MCNC: 9.5 MG/DL (ref 8.5–10.1)
CHLORIDE SERPL-SCNC: 111 MMOL/L (ref 94–109)
CO2 SERPL-SCNC: 21 MMOL/L (ref 20–32)
CREAT SERPL-MCNC: 0.73 MG/DL (ref 0.52–1.04)
DIFFERENTIAL METHOD BLD: ABNORMAL
EOSINOPHIL # BLD AUTO: 0 10E9/L (ref 0–0.7)
EOSINOPHIL NFR BLD AUTO: 0 %
ERYTHROCYTE [DISTWIDTH] IN BLOOD BY AUTOMATED COUNT: 13.2 % (ref 10–15)
ETHANOL SERPL-MCNC: 0.05 G/DL
GFR SERPL CREATININE-BSD FRML MDRD: >90 ML/MIN/{1.73_M2}
GLUCOSE SERPL-MCNC: 126 MG/DL (ref 70–99)
HCT VFR BLD AUTO: 43.1 % (ref 35–47)
HGB BLD-MCNC: 14 G/DL (ref 11.7–15.7)
IMM GRANULOCYTES # BLD: 0 10E9/L (ref 0–0.4)
IMM GRANULOCYTES NFR BLD: 0.4 %
LIPASE SERPL-CCNC: 156 U/L (ref 73–393)
LYMPHOCYTES # BLD AUTO: 1.2 10E9/L (ref 0.8–5.3)
LYMPHOCYTES NFR BLD AUTO: 11 %
MCH RBC QN AUTO: 29.2 PG (ref 26.5–33)
MCHC RBC AUTO-ENTMCNC: 32.5 G/DL (ref 31.5–36.5)
MCV RBC AUTO: 90 FL (ref 78–100)
MONOCYTES # BLD AUTO: 0.2 10E9/L (ref 0–1.3)
MONOCYTES NFR BLD AUTO: 1.7 %
NEUTROPHILS # BLD AUTO: 9.2 10E9/L (ref 1.6–8.3)
NEUTROPHILS NFR BLD AUTO: 86.6 %
NRBC # BLD AUTO: 0 10*3/UL
NRBC BLD AUTO-RTO: 0 /100
PLATELET # BLD AUTO: 293 10E9/L (ref 150–450)
POTASSIUM SERPL-SCNC: 3.5 MMOL/L (ref 3.4–5.3)
PROT SERPL-MCNC: 8.1 G/DL (ref 6.8–8.8)
RBC # BLD AUTO: 4.79 10E12/L (ref 3.8–5.2)
SODIUM SERPL-SCNC: 143 MMOL/L (ref 133–144)
WBC # BLD AUTO: 10.6 10E9/L (ref 4–11)

## 2020-02-02 PROCEDURE — 99285 EMERGENCY DEPT VISIT HI MDM: CPT | Mod: Z6 | Performed by: FAMILY MEDICINE

## 2020-02-02 PROCEDURE — 99285 EMERGENCY DEPT VISIT HI MDM: CPT | Mod: 25 | Performed by: FAMILY MEDICINE

## 2020-02-02 PROCEDURE — 25000128 H RX IP 250 OP 636

## 2020-02-02 PROCEDURE — 85025 COMPLETE CBC W/AUTO DIFF WBC: CPT | Performed by: FAMILY MEDICINE

## 2020-02-02 PROCEDURE — 25000132 ZZH RX MED GY IP 250 OP 250 PS 637: Performed by: FAMILY MEDICINE

## 2020-02-02 PROCEDURE — 96361 HYDRATE IV INFUSION ADD-ON: CPT | Performed by: FAMILY MEDICINE

## 2020-02-02 PROCEDURE — 96375 TX/PRO/DX INJ NEW DRUG ADDON: CPT | Performed by: FAMILY MEDICINE

## 2020-02-02 PROCEDURE — 25000125 ZZHC RX 250: Performed by: FAMILY MEDICINE

## 2020-02-02 PROCEDURE — 83690 ASSAY OF LIPASE: CPT | Performed by: FAMILY MEDICINE

## 2020-02-02 PROCEDURE — 25800030 ZZH RX IP 258 OP 636: Performed by: FAMILY MEDICINE

## 2020-02-02 PROCEDURE — 80053 COMPREHEN METABOLIC PANEL: CPT | Performed by: FAMILY MEDICINE

## 2020-02-02 PROCEDURE — 96374 THER/PROPH/DIAG INJ IV PUSH: CPT | Performed by: FAMILY MEDICINE

## 2020-02-02 PROCEDURE — 96376 TX/PRO/DX INJ SAME DRUG ADON: CPT | Performed by: FAMILY MEDICINE

## 2020-02-02 PROCEDURE — C9113 INJ PANTOPRAZOLE SODIUM, VIA: HCPCS | Performed by: FAMILY MEDICINE

## 2020-02-02 PROCEDURE — 25000128 H RX IP 250 OP 636: Performed by: FAMILY MEDICINE

## 2020-02-02 PROCEDURE — 80320 DRUG SCREEN QUANTALCOHOLS: CPT | Performed by: FAMILY MEDICINE

## 2020-02-02 RX ORDER — ONDANSETRON 4 MG/1
4 TABLET, ORALLY DISINTEGRATING ORAL EVERY 8 HOURS PRN
Qty: 20 TABLET | Refills: 0 | Status: SHIPPED | OUTPATIENT
Start: 2020-02-02 | End: 2020-02-05

## 2020-02-02 RX ORDER — ONDANSETRON 2 MG/ML
INJECTION INTRAMUSCULAR; INTRAVENOUS
Status: COMPLETED
Start: 2020-02-02 | End: 2020-02-02

## 2020-02-02 RX ORDER — ONDANSETRON 2 MG/ML
4 INJECTION INTRAMUSCULAR; INTRAVENOUS EVERY 30 MIN PRN
Status: DISCONTINUED | OUTPATIENT
Start: 2020-02-02 | End: 2020-02-02 | Stop reason: HOSPADM

## 2020-02-02 RX ORDER — HYDROMORPHONE HYDROCHLORIDE 1 MG/ML
0.5 INJECTION, SOLUTION INTRAMUSCULAR; INTRAVENOUS; SUBCUTANEOUS ONCE
Status: COMPLETED | OUTPATIENT
Start: 2020-02-02 | End: 2020-02-02

## 2020-02-02 RX ADMIN — ONDANSETRON 4 MG: 2 INJECTION INTRAMUSCULAR; INTRAVENOUS at 12:07

## 2020-02-02 RX ADMIN — PANTOPRAZOLE SODIUM 40 MG: 40 INJECTION, POWDER, LYOPHILIZED, FOR SOLUTION INTRAVENOUS at 12:48

## 2020-02-02 RX ADMIN — LIDOCAINE HYDROCHLORIDE 30 ML: 20 SOLUTION ORAL; TOPICAL at 12:56

## 2020-02-02 RX ADMIN — HYDROMORPHONE HYDROCHLORIDE 0.5 MG: 1 INJECTION, SOLUTION INTRAMUSCULAR; INTRAVENOUS; SUBCUTANEOUS at 12:53

## 2020-02-02 RX ADMIN — ONDANSETRON 4 MG: 2 INJECTION INTRAMUSCULAR; INTRAVENOUS at 13:01

## 2020-02-02 RX ADMIN — SODIUM CHLORIDE 1000 ML: 9 INJECTION, SOLUTION INTRAVENOUS at 12:06

## 2020-02-02 RX ADMIN — SODIUM CHLORIDE, POTASSIUM CHLORIDE, SODIUM LACTATE AND CALCIUM CHLORIDE 1000 ML: 600; 310; 30; 20 INJECTION, SOLUTION INTRAVENOUS at 13:22

## 2020-02-02 ASSESSMENT — MIFFLIN-ST. JEOR: SCORE: 1348.24

## 2020-02-02 NOTE — ED NOTES
"Pt presents to ED with complaints of nausea, burning in abd and throat.  Has hx gastritis and esophagitis. Pt has not taken anything for the pain.  Pt last alcoholic drink was at 0100 today.  Pt A&Ox4.  Denies diarrhea.  SOA \"because I can't keep it together, it burns so much. \" Pt hyperventilating upon arrival, RN coached pt to slow breathing.     "

## 2020-02-02 NOTE — DISCHARGE INSTRUCTIONS
You should avoid drinking alcohol altogether.  As you are already aware he should avoid NSAID class medications such as aspirin and ibuprofen.  [place] diet clear fluids.  Please continue your Pepcid.  Follow-up as planned with GI medicine in March.  Return to the emergency department if worse or changes.  You may use Zofran if you have any further nausea/vomiting.

## 2020-02-02 NOTE — ED PROVIDER NOTES
"  History     Chief Complaint   Patient presents with     Abdominal Pain     started this am, has had in the past, has been drinking      Nausea & Vomiting     HPI  Harrison Raymond is a 29 year old female, past medical history significant for migraine headaches, presents to the emergency department with concerns of abdominal pain beginning this morning after heavy drinking last night.  History is obtained from the patient who states that she is markedly uncomfortable with a burning sensation in the epigastrium up into her chest and into her mouth with a foul taste since awakening this morning.  Last night she had \"a few shots\" but would not tell me how much she drank.  She normally takes famotidine daily but forgot to take it yesterday and has not taken it yet today.  She states that there is suspicion that she has esophagitis and gastritis but she has not had any diagnostic work-up yet; she has an appointment with GI in March.  She states that she does not drink on a regular basis however she did drink a fair bit last night.  Episodes like this have occurred in the past under similar circumstances the last 1 was over a month ago.      Allergies:  Allergies   Allergen Reactions     Bees Hives and Swelling     Norco [Hydrocodone-Acetaminophen] Other (See Comments)     headache     Amoxicillin Rash       Problem List:    Patient Active Problem List    Diagnosis Date Noted     Supervision of normal first pregnancy 10/10/2014     Priority: Medium     Tdap given 16/2015          Past Medical History:    Past Medical History:   Diagnosis Date     Chickenpox      Migraines        Past Surgical History:    Past Surgical History:   Procedure Laterality Date     NO HISTORY OF SURGERY         Family History:    Family History   Problem Relation Age of Onset     Family History Negative Other      Unknown/Adopted Father         unknown       Social History:  Marital Status:  Single [1]  Social History     Tobacco Use     " "Smoking status: Current Every Day Smoker     Packs/day: 0.50     Smokeless tobacco: Never Used   Substance Use Topics     Alcohol use: Yes     Comment: rare- quit with pregnancy     Drug use: No        Medications:    famotidine (PEPCID) 10 MG tablet  fluticasone (FLONASE) 50 MCG/ACT nasal spray  levonorgestrel-ethinyl estradiol (NORDETTE) 0.15-30 MG-MCG tablet  ondansetron (ZOFRAN ODT) 4 MG ODT tab  EPINEPHrine (EPIPEN/ADRENACLICK/OR ANY BX GENERIC EQUIV) 0.3 MG/0.3ML injection 2-pack  ondansetron (ZOFRAN-ODT) 4 MG ODT tab          Review of Systems   All other systems reviewed and are negative.      Physical Exam   BP: 105/70  Heart Rate: 83  Temp: 98.9  F (37.2  C)  Resp: 16  Height: 165.1 cm (5' 5\")  Weight: 61.2 kg (135 lb)  SpO2: 98 %      Physical Exam  Vitals signs and nursing note reviewed.   Constitutional:       General: She is in acute distress.      Appearance: She is well-developed and normal weight.      Comments: Appears acutely uncomfortable due to pain writhing on the bed.   HENT:      Head: Normocephalic and atraumatic.   Eyes:      Extraocular Movements: Extraocular movements intact.      Pupils: Pupils are equal, round, and reactive to light.   Cardiovascular:      Rate and Rhythm: Normal rate and regular rhythm.   Pulmonary:      Effort: Pulmonary effort is normal.      Breath sounds: Normal breath sounds.   Abdominal:      Comments: Soft tender epigastrium and left upper quadrant with voluntary guarding.  No rebound.   Skin:     General: Skin is warm and dry.      Capillary Refill: Capillary refill takes less than 2 seconds.   Neurological:      General: No focal deficit present.      Mental Status: She is alert.   Psychiatric:         Mood and Affect: Mood is anxious.         ED Course        Procedures               Critical Care time:  none               Results for orders placed or performed during the hospital encounter of 02/02/20 (from the past 24 hour(s))   CBC with platelets " differential   Result Value Ref Range    WBC 10.6 4.0 - 11.0 10e9/L    RBC Count 4.79 3.8 - 5.2 10e12/L    Hemoglobin 14.0 11.7 - 15.7 g/dL    Hematocrit 43.1 35.0 - 47.0 %    MCV 90 78 - 100 fl    MCH 29.2 26.5 - 33.0 pg    MCHC 32.5 31.5 - 36.5 g/dL    RDW 13.2 10.0 - 15.0 %    Platelet Count 293 150 - 450 10e9/L    Diff Method Automated Method     % Neutrophils 86.6 %    % Lymphocytes 11.0 %    % Monocytes 1.7 %    % Eosinophils 0.0 %    % Basophils 0.3 %    % Immature Granulocytes 0.4 %    Nucleated RBCs 0 0 /100    Absolute Neutrophil 9.2 (H) 1.6 - 8.3 10e9/L    Absolute Lymphocytes 1.2 0.8 - 5.3 10e9/L    Absolute Monocytes 0.2 0.0 - 1.3 10e9/L    Absolute Eosinophils 0.0 0.0 - 0.7 10e9/L    Absolute Basophils 0.0 0.0 - 0.2 10e9/L    Abs Immature Granulocytes 0.0 0 - 0.4 10e9/L    Absolute Nucleated RBC 0.0    Comprehensive metabolic panel   Result Value Ref Range    Sodium 143 133 - 144 mmol/L    Potassium 3.5 3.4 - 5.3 mmol/L    Chloride 111 (H) 94 - 109 mmol/L    Carbon Dioxide 21 20 - 32 mmol/L    Anion Gap 11 3 - 14 mmol/L    Glucose 126 (H) 70 - 99 mg/dL    Urea Nitrogen 9 7 - 30 mg/dL    Creatinine 0.73 0.52 - 1.04 mg/dL    GFR Estimate >90 >60 mL/min/[1.73_m2]    GFR Estimate If Black >90 >60 mL/min/[1.73_m2]    Calcium 9.5 8.5 - 10.1 mg/dL    Bilirubin Total 0.4 0.2 - 1.3 mg/dL    Albumin 4.2 3.4 - 5.0 g/dL    Protein Total 8.1 6.8 - 8.8 g/dL    Alkaline Phosphatase 68 40 - 150 U/L    ALT 24 0 - 50 U/L    AST 16 0 - 45 U/L   Lipase   Result Value Ref Range    Lipase 156 73 - 393 U/L   Alcohol ethyl   Result Value Ref Range    Ethanol g/dL 0.05 (H) <0.01 g/dL       Medications   ondansetron (ZOFRAN) injection 4 mg (4 mg Intravenous Given 2/2/20 1301)   0.9% sodium chloride BOLUS (0 mLs Intravenous Stopped 2/2/20 1322)   lactated ringers BOLUS 1,000 mL (1,000 mLs Intravenous New Bag 2/2/20 1322)   pantoprazole (PROTONIX) 40 mg IV push injection (40 mg Intravenous Given 2/2/20 1248)   HYDROmorphone (PF)  (DILAUDID) injection 0.5 mg (0.5 mg Intravenous Given 2/2/20 1253)   lidocaine (XYLOCAINE) 2 % 15 mL, alum & mag hydroxide-simethicone (MYLANTA ES/MAALOX  ES) 15 mL GI Cocktail (30 mLs Oral Given 2/2/20 1256)     2:25 PM  Recheck finds the patient much more comfortable in appearance.  She no longer has any epigastric/precordial area pain.  No nausea.  Will start with ice chips and fluids.  2:59 PM  Tolerating ice chips and fluids orally.  No discomfort, no nausea or vomiting.  Plan for disposition to home.  Assessments & Plan (with Medical Decision Making)   29-year-old female past medical history reviewed as above who presents the emergency department with concerns of epigastric/retrosternal pain in the context of acute alcohol intake late last night and early this morning as described in the HPI.  On exam she has exquisite tenderness in the upper abdomen midline, very anxious and obviously uncomfortable.  She responded well to Zofran, Protonix, GI cocktail, IV fluids with complete resolution of her symptoms while in the emergency department.  Tolerate oral fluids and light solids prior to disposition home.  Discussion with the patient with respect to NSAID use and complete avoidance of both NSAIDs and alcohol.  She has plans for GI evaluation in March of this year.      Disclaimer: This note consists of symbols derived from keyboarding, dictation and/or voice recognition software. As a result, there may be errors in the script that have gone undetected. Please consider this when interpreting information found in this chart.      I have reviewed the nursing notes.    I have reviewed the findings, diagnosis, plan and need for follow up with the patient.       New Prescriptions    ONDANSETRON (ZOFRAN ODT) 4 MG ODT TAB    Take 1 tablet (4 mg) by mouth every 8 hours as needed for nausea or vomiting       Final diagnoses:   Acute alcoholic gastritis without hemorrhage       2/2/2020   [facility]  DEPARTMENT     [facility] Medical CenterBethel tsang MD  02/02/20 7547

## 2020-02-02 NOTE — ED AVS SNAPSHOT
[facility] Emergency Department  [address] [address]  Phone:  [phone]  Fax:  [phone]                                    Harrison Raymond   MRN: [id number]    Department:  [facility] Emergency Department   Date of Visit:  2/2/2020           After Visit Summary Signature Page    I have received my discharge instructions, and my questions have been answered. I have discussed any challenges I see with this plan with the nurse or doctor.    ..........................................................................................................................................  Patient/Patient Representative Signature      ..........................................................................................................................................  Patient Representative Print Name and Relationship to Patient    ..................................................               ................................................  Date                                   Time    ..........................................................................................................................................  Reviewed by Signature/Title    ...................................................              ..............................................  Date                                               Time          22EPIC Rev 08/18

## 2020-06-09 ENCOUNTER — HOSPITAL ENCOUNTER (EMERGENCY)
Facility: CLINIC | Age: 30
Discharge: HOME | End: 2020-06-09
Attending: EMERGENCY MEDICINE | Admitting: EMERGENCY MEDICINE
Payer: COMMERCIAL

## 2020-06-09 VITALS
WEIGHT: 130 LBS | OXYGEN SATURATION: 98 % | DIASTOLIC BLOOD PRESSURE: 74 MMHG | BODY MASS INDEX: 20.89 KG/M2 | SYSTOLIC BLOOD PRESSURE: 117 MMHG | HEIGHT: 66 IN | HEART RATE: 84 BPM | RESPIRATION RATE: 18 BRPM

## 2020-06-09 DIAGNOSIS — G43.909 MIGRAINE WITHOUT STATUS MIGRAINOSUS, NOT INTRACTABLE, UNSPECIFIED MIGRAINE TYPE: ICD-10-CM

## 2020-06-09 PROCEDURE — 96375 TX/PRO/DX INJ NEW DRUG ADDON: CPT | Performed by: EMERGENCY MEDICINE

## 2020-06-09 PROCEDURE — 99284 EMERGENCY DEPT VISIT MOD MDM: CPT | Mod: Z6 | Performed by: EMERGENCY MEDICINE

## 2020-06-09 PROCEDURE — 96374 THER/PROPH/DIAG INJ IV PUSH: CPT | Performed by: EMERGENCY MEDICINE

## 2020-06-09 PROCEDURE — 25800030 ZZH RX IP 258 OP 636: Performed by: EMERGENCY MEDICINE

## 2020-06-09 PROCEDURE — 99284 EMERGENCY DEPT VISIT MOD MDM: CPT | Mod: 25 | Performed by: EMERGENCY MEDICINE

## 2020-06-09 PROCEDURE — 25000128 H RX IP 250 OP 636: Performed by: EMERGENCY MEDICINE

## 2020-06-09 PROCEDURE — 96361 HYDRATE IV INFUSION ADD-ON: CPT | Performed by: EMERGENCY MEDICINE

## 2020-06-09 RX ORDER — KETOROLAC TROMETHAMINE 15 MG/ML
15 INJECTION, SOLUTION INTRAMUSCULAR; INTRAVENOUS ONCE
Status: COMPLETED | OUTPATIENT
Start: 2020-06-09 | End: 2020-06-09

## 2020-06-09 RX ORDER — DEXAMETHASONE SODIUM PHOSPHATE 10 MG/ML
10 INJECTION, SOLUTION INTRAMUSCULAR; INTRAVENOUS ONCE
Status: COMPLETED | OUTPATIENT
Start: 2020-06-09 | End: 2020-06-09

## 2020-06-09 RX ORDER — METOCLOPRAMIDE HYDROCHLORIDE 5 MG/ML
10 INJECTION INTRAMUSCULAR; INTRAVENOUS ONCE
Status: COMPLETED | OUTPATIENT
Start: 2020-06-09 | End: 2020-06-09

## 2020-06-09 RX ORDER — DIPHENHYDRAMINE HYDROCHLORIDE 50 MG/ML
50 INJECTION INTRAMUSCULAR; INTRAVENOUS ONCE
Status: COMPLETED | OUTPATIENT
Start: 2020-06-09 | End: 2020-06-09

## 2020-06-09 RX ADMIN — KETOROLAC TROMETHAMINE 15 MG: 15 INJECTION, SOLUTION INTRAMUSCULAR; INTRAVENOUS at 00:56

## 2020-06-09 RX ADMIN — METOCLOPRAMIDE HYDROCHLORIDE 10 MG: 5 INJECTION INTRAMUSCULAR; INTRAVENOUS at 00:56

## 2020-06-09 RX ADMIN — DIPHENHYDRAMINE HYDROCHLORIDE 50 MG: 50 INJECTION, SOLUTION INTRAMUSCULAR; INTRAVENOUS at 00:56

## 2020-06-09 RX ADMIN — SODIUM CHLORIDE 1000 ML: 9 INJECTION, SOLUTION INTRAVENOUS at 00:56

## 2020-06-09 RX ADMIN — DEXAMETHASONE SODIUM PHOSPHATE 10 MG: 10 INJECTION, SOLUTION INTRAMUSCULAR; INTRAVENOUS at 02:13

## 2020-06-09 ASSESSMENT — MIFFLIN-ST. JEOR: SCORE: 1328.49

## 2020-06-09 NOTE — ED PROVIDER NOTES
History   No chief complaint on file.    HPI  Harrison Raymond is a 30 year old female with a past medical history significant for migraines who presents emergency department complaining of a headache.  Symptoms began around 2:00 this evening with a dull frontal headache which progressed throughout the day.  She had ibuprofen at 10:00 and still continued to have a headache.  She did have some photophobia and vomited prior to arrival.  Patient has had similar headaches in the past these are typical of her migraine she gets.  She also states she has a very significant sunburn and thinks this is contributing to her headache.  She denies any neck pain he has not had any congestion or sore throat denies a fever.  She has not had any chest pain or shortness of breath denies any back pain.  She has not had any abdominal pain.  She denies any bowel or bladder dysfunction.  She has not had a rash other than the sunburn.  Rates her pain a 7 out of 10.    Allergies:  Allergies   Allergen Reactions     Bees Hives and Swelling     Norco [Hydrocodone-Acetaminophen] Other (See Comments)     headache     Amoxicillin Rash       Problem List:    Patient Active Problem List    Diagnosis Date Noted     Supervision of normal first pregnancy 10/10/2014     Priority: Medium     Tdap given 16/2015          Past Medical History:    Past Medical History:   Diagnosis Date     Chickenpox      Migraines        Past Surgical History:    Past Surgical History:   Procedure Laterality Date     NO HISTORY OF SURGERY         Family History:    Family History   Problem Relation Age of Onset     Family History Negative Other      Unknown/Adopted Father         unknown       Social History:  Marital Status:  Single [1]  Social History     Tobacco Use     Smoking status: Current Every Day Smoker     Packs/day: 0.50     Smokeless tobacco: Never Used   Substance Use Topics     Alcohol use: Yes     Comment: rare- quit with pregnancy     Drug use: No  "       Medications:    levonorgestrel-ethinyl estradiol (NORDETTE) 0.15-30 MG-MCG tablet  EPINEPHrine (EPIPEN/ADRENACLICK/OR ANY BX GENERIC EQUIV) 0.3 MG/0.3ML injection 2-pack  fluticasone (FLONASE) 50 MCG/ACT nasal spray  ondansetron (ZOFRAN-ODT) 4 MG ODT tab          Review of Systems  All systems reviewed and other than pertinent positives and negatives in HPI all other systems are negative  Physical Exam   BP: (!) 119/97  Heart Rate: 88  Resp: 18  Height: 166.4 cm (5' 5.5\")  Weight: 59 kg (130 lb)  SpO2: 99 %      Physical Exam  Vitals signs and nursing note reviewed.   Constitutional:       General: She is not in acute distress.     Appearance: Normal appearance. She is not ill-appearing, toxic-appearing or diaphoretic.   HENT:      Head: Normocephalic.      Nose: Nose normal.      Mouth/Throat:      Mouth: Mucous membranes are moist.      Pharynx: Oropharynx is clear.   Eyes:      Extraocular Movements: Extraocular movements intact.      Conjunctiva/sclera: Conjunctivae normal.      Pupils: Pupils are equal, round, and reactive to light.   Neck:      Musculoskeletal: Normal range of motion and neck supple. No neck rigidity or muscular tenderness.   Cardiovascular:      Rate and Rhythm: Normal rate and regular rhythm.      Pulses: Normal pulses.      Heart sounds: Normal heart sounds. No murmur.   Pulmonary:      Effort: Pulmonary effort is normal.      Breath sounds: Normal breath sounds. No wheezing, rhonchi or rales.   Chest:      Chest wall: No tenderness.   Musculoskeletal: Normal range of motion.      Right lower leg: No edema.      Left lower leg: No edema.   Skin:     General: Skin is warm and dry.      Capillary Refill: Capillary refill takes less than 2 seconds.      Findings: No rash.   Neurological:      General: No focal deficit present.      Mental Status: She is alert.   Psychiatric:         Mood and Affect: Mood normal.         ED Course        Procedures               Critical Care time:  " none               No results found for this or any previous visit (from the past 24 hour(s)).    Medications   dexamethasone PF (DECADRON) injection 10 mg (has no administration in time range)   0.9% sodium chloride BOLUS (1,000 mLs Intravenous New Bag 6/9/20 0056)   ketorolac (TORADOL) injection 15 mg (15 mg Intravenous Given 6/9/20 0056)   metoclopramide (REGLAN) injection 10 mg (10 mg Intravenous Given 6/9/20 0056)   diphenhydrAMINE (BENADRYL) injection 50 mg (50 mg Intravenous Given 6/9/20 0056)       Assessments & Plan (with Medical Decision Making) records were reviewed.  Patient was given IV fluids Reglan Benadryl and Toradol and patient was observed.  Due to this headache pain similar to previous headaches with stable vital signs and no fever I did not think labs or ridging studies were warranted.  Patient was in for an hour and had improvement of her headache still some pain with movement and therefore she was given IV Decadron.  Patient was signed out to Dr. Edge to follow-up on results of medication given.     I have reviewed the nursing notes.    I have reviewed the findings, diagnosis, plan and need for follow up with the patient.       New Prescriptions    No medications on file       Final diagnoses:   Migraine without status migrainosus, not intractable, unspecified migraine type       6/9/2020   [facility] EMERGENCY DEPARTMENT     Bijan Stafford MD  06/09/20 4384

## 2020-06-09 NOTE — ED AVS SNAPSHOT
[facility] Emergency Department  [address] [address]  Phone:  [phone]  Fax:  [phone]                                    Harrison Raymond   MRN: [id number]    Department:  [facility] Emergency Department   Date of Visit:  6/9/2020           After Visit Summary Signature Page    I have received my discharge instructions, and my questions have been answered. I have discussed any challenges I see with this plan with the nurse or doctor.    ..........................................................................................................................................  Patient/Patient Representative Signature      ..........................................................................................................................................  Patient Representative Print Name and Relationship to Patient    ..................................................               ................................................  Date                                   Time    ..........................................................................................................................................  Reviewed by Signature/Title    ...................................................              ..............................................  Date                                               Time          22EPIC Rev 08/18

## 2020-06-09 NOTE — DISCHARGE INSTRUCTIONS
Return if symptoms worsen or new symptoms develop.  Drink plenty of fluids.  Take ibuprofen or Tylenol for pain.  If any change in your headache vomiting chest pain shortness of breath abdominal pain focal numbness weakness any extremity bowel or bladder dysfunction rash or other symptoms present please return for recheck.

## 2020-06-09 NOTE — ED TRIAGE NOTES
Headache started around 1400 and started vomiting about an hour ago. Hx of migraines. Feels similar. Also complaints of sunburn, worried about sun poisoning. Took Tylenol 1000 mg at 2200.

## 2024-02-05 ENCOUNTER — HOSPITAL ENCOUNTER (EMERGENCY)
Facility: CLINIC | Age: 34
Discharge: HOME | End: 2024-02-06
Attending: STUDENT IN AN ORGANIZED HEALTH CARE EDUCATION/TRAINING PROGRAM | Admitting: STUDENT IN AN ORGANIZED HEALTH CARE EDUCATION/TRAINING PROGRAM
Payer: COMMERCIAL

## 2024-02-05 DIAGNOSIS — R51.9 ACUTE NONINTRACTABLE HEADACHE, UNSPECIFIED HEADACHE TYPE: ICD-10-CM

## 2024-02-05 PROCEDURE — 96375 TX/PRO/DX INJ NEW DRUG ADDON: CPT | Performed by: STUDENT IN AN ORGANIZED HEALTH CARE EDUCATION/TRAINING PROGRAM

## 2024-02-05 PROCEDURE — 96361 HYDRATE IV INFUSION ADD-ON: CPT | Performed by: STUDENT IN AN ORGANIZED HEALTH CARE EDUCATION/TRAINING PROGRAM

## 2024-02-05 PROCEDURE — 99284 EMERGENCY DEPT VISIT MOD MDM: CPT | Mod: 25 | Performed by: STUDENT IN AN ORGANIZED HEALTH CARE EDUCATION/TRAINING PROGRAM

## 2024-02-05 PROCEDURE — 99284 EMERGENCY DEPT VISIT MOD MDM: CPT | Performed by: STUDENT IN AN ORGANIZED HEALTH CARE EDUCATION/TRAINING PROGRAM

## 2024-02-05 PROCEDURE — 96374 THER/PROPH/DIAG INJ IV PUSH: CPT | Performed by: STUDENT IN AN ORGANIZED HEALTH CARE EDUCATION/TRAINING PROGRAM

## 2024-02-06 VITALS
BODY MASS INDEX: 29.99 KG/M2 | TEMPERATURE: 97.8 F | HEART RATE: 71 BPM | HEIGHT: 65 IN | RESPIRATION RATE: 18 BRPM | DIASTOLIC BLOOD PRESSURE: 82 MMHG | OXYGEN SATURATION: 97 % | SYSTOLIC BLOOD PRESSURE: 127 MMHG | WEIGHT: 180 LBS

## 2024-02-06 PROCEDURE — 258N000003 HC RX IP 258 OP 636: Performed by: STUDENT IN AN ORGANIZED HEALTH CARE EDUCATION/TRAINING PROGRAM

## 2024-02-06 PROCEDURE — 250N000011 HC RX IP 250 OP 636: Performed by: STUDENT IN AN ORGANIZED HEALTH CARE EDUCATION/TRAINING PROGRAM

## 2024-02-06 RX ORDER — SUMATRIPTAN 50 MG/1
50 TABLET, FILM COATED ORAL
Qty: 20 TABLET | Refills: 0 | Status: SHIPPED | OUTPATIENT
Start: 2024-02-06

## 2024-02-06 RX ORDER — DEXAMETHASONE SODIUM PHOSPHATE 10 MG/ML
10 INJECTION, SOLUTION INTRAMUSCULAR; INTRAVENOUS ONCE
Status: COMPLETED | OUTPATIENT
Start: 2024-02-06 | End: 2024-02-06

## 2024-02-06 RX ORDER — KETOROLAC TROMETHAMINE 15 MG/ML
15 INJECTION, SOLUTION INTRAMUSCULAR; INTRAVENOUS ONCE
Status: COMPLETED | OUTPATIENT
Start: 2024-02-06 | End: 2024-02-06

## 2024-02-06 RX ORDER — DIPHENHYDRAMINE HYDROCHLORIDE 50 MG/ML
25 INJECTION INTRAMUSCULAR; INTRAVENOUS ONCE
Status: COMPLETED | OUTPATIENT
Start: 2024-02-06 | End: 2024-02-06

## 2024-02-06 RX ORDER — HYDROMORPHONE HYDROCHLORIDE 1 MG/ML
0.5 INJECTION, SOLUTION INTRAMUSCULAR; INTRAVENOUS; SUBCUTANEOUS
Status: DISCONTINUED | OUTPATIENT
Start: 2024-02-06 | End: 2024-02-06

## 2024-02-06 RX ORDER — METOCLOPRAMIDE HYDROCHLORIDE 5 MG/ML
10 INJECTION INTRAMUSCULAR; INTRAVENOUS ONCE
Status: COMPLETED | OUTPATIENT
Start: 2024-02-06 | End: 2024-02-06

## 2024-02-06 RX ADMIN — METOCLOPRAMIDE HYDROCHLORIDE 10 MG: 5 INJECTION INTRAMUSCULAR; INTRAVENOUS at 00:16

## 2024-02-06 RX ADMIN — DEXAMETHASONE SODIUM PHOSPHATE 10 MG: 10 INJECTION INTRAMUSCULAR; INTRAVENOUS at 00:30

## 2024-02-06 RX ADMIN — DIPHENHYDRAMINE HYDROCHLORIDE 25 MG: 50 INJECTION, SOLUTION INTRAMUSCULAR; INTRAVENOUS at 00:12

## 2024-02-06 RX ADMIN — KETOROLAC TROMETHAMINE 15 MG: 15 INJECTION, SOLUTION INTRAMUSCULAR; INTRAVENOUS at 01:36

## 2024-02-06 RX ADMIN — SODIUM CHLORIDE 1000 ML: 9 INJECTION, SOLUTION INTRAVENOUS at 00:11

## 2024-02-06 ASSESSMENT — ACTIVITIES OF DAILY LIVING (ADL): ADLS_ACUITY_SCORE: 35

## 2024-02-06 NOTE — ED TRIAGE NOTES
Pt here for headache for 9 days. Pt was at [facility] last night with no findings. Pt hasn't taken anything for pain since this morning.     Pt also reports terminating a pregnancy on 1/31/24 via D&C in clinic and was approximately 12 weeks gestation.     Triage Assessment (Adult)       Row Name 02/05/24 2324          Triage Assessment    Airway WDL WDL        Respiratory WDL    Respiratory WDL WDL        Skin Circulation/Temperature WDL    Skin Circulation/Temperature WDL WDL        Cardiac WDL    Cardiac WDL WDL        Peripheral/Neurovascular WDL    Peripheral Neurovascular WDL WDL        Cognitive/Neuro/Behavioral WDL    Cognitive/Neuro/Behavioral WDL WDL

## 2024-02-06 NOTE — ED PROVIDER NOTES
"  History     Chief Complaint   Patient presents with    Headache     HPI  Harrison Raymond is a 33 year old female who has a history of migraine headaches who presents for evaluation of headache.  Patient was seen at [facility] yesterday for the same thing and had a full workup.  She had a negative head CT, negative CT PE study.  She had a negative influenza, COVID and RSV.  Hemoglobin was 10.9, she had no leukocytosis.  Metabolic panel was reassuring.  She was ultimately treated with Toradol, Tylenol, fluids and droperidol.  She presents today with ongoing headache.  Patient states that she has been having ongoing headaches for the last 9 days.  She states they are similar to previous migraines, but they have never lasted this long.  She describes it as left temporal pain behind her left eye.  She endorses photophobia.  She has nausea without vomiting.  No weakness in the extremities.  No trouble walking.  She denies any trauma.  No fever.  She has been taking Tylenol and ibuprofen.  She reports getting some improvement in pain last night, but pain never completely went away.  She reports that the droperidol made her feel \"like she was going to crawl out of her skin.\"  She states that she used to have Imitrex and this is helpful, but she has not had a prescription for this in quite some time.     Of note, she states she did have an elective  on 2024 via D&C, states that her headache had started prior to this.    Allergies:  Allergies   Allergen Reactions    Bees Hives and Swelling    Norco [Hydrocodone-Acetaminophen] Other (See Comments)     headache    Amoxicillin Rash       Problem List:    Patient Active Problem List    Diagnosis Date Noted    Supervision of normal first pregnancy 10/10/2014     Priority: Medium     Tdap given           Past Medical History:    Past Medical History:   Diagnosis Date    Chickenpox     Migraines        Past Surgical History:    Past Surgical History: " "  Procedure Laterality Date    NO HISTORY OF SURGERY      OTHER SURGICAL HISTORY      none       Family History:    Family History   Problem Relation Age of Onset    Family History Negative Other     Unknown/Adopted Father         unknown    Coronary Artery Disease No family hx of     Diabetes No family hx of        Social History:  Marital Status:  Single [1]  Social History     Tobacco Use    Smoking status: Every Day     Packs/day: .5     Types: Cigarettes    Smokeless tobacco: Never   Substance Use Topics    Alcohol use: Yes     Comment: rare- quit with pregnancy    Drug use: No        Medications:    SUMAtriptan (IMITREX) 50 MG tablet  EPINEPHrine (EPIPEN/ADRENACLICK/OR ANY BX GENERIC EQUIV) 0.3 MG/0.3ML injection 2-pack  fluticasone (FLONASE) 50 MCG/ACT nasal spray  levonorgestrel-ethinyl estradiol (NORDETTE) 0.15-30 MG-MCG tablet  ondansetron (ZOFRAN-ODT) 4 MG ODT tab          Review of Systems  See HPI  Physical Exam   BP: (!) 148/92  Pulse: 90  Temp: 97.8  F (36.6  C)  Resp: 18  Height: 165.1 cm (5' 5\")  Weight: 81.6 kg (180 lb)  SpO2: 98 %      Physical Exam  /82   Pulse 71   Temp 97.8  F (36.6  C) (Oral)   Resp 18   Ht 1.651 m (5' 5\")   Wt 81.6 kg (180 lb)   SpO2 97%   BMI 29.95 kg/m    General: alert, interactive, in no apparent distress  Head: atraumatic  Nose: no rhinorrhea or epistaxis  Ears: no external auditory canal discharge or bleeding.    Eyes: Sclera nonicteric. Conjunctiva noninjected. PERRL, EOMI  Mouth: no tonsillar erythema, edema, or exudate.  Moist mucous membranes  Neck: supple, moving spontaneously no midline cervical tenderness  Lungs: No increased work of breathing.  Clear to auscultation bilaterally.  CV: RRR, peripheral pulses palpable and symmetric  Abdomen: soft, nt, nd, no guarding or rebound.   Extremities: Warm and well-perfused.  Skin: no rash or diaphoresis  Neuro: CN II-XII grossly intact, strength 5/5 in UE and LEs bilaterally, sensation intact to light touch " in UE and LEs bilaterally;     ED Course                 Procedures           Critical Care time:  none         No results found for this or any previous visit (from the past 24 hour(s)).    Medications   sodium chloride 0.9% BOLUS 1,000 mL (0 mLs Intravenous Stopped 2/6/24 0136)   metoclopramide (REGLAN) injection 10 mg (10 mg Intravenous $Given 2/6/24 0016)   dexAMETHasone PF (DECADRON) injection 10 mg (10 mg Intravenous $Given 2/6/24 0030)   diphenhydrAMINE (BENADRYL) injection 25 mg (25 mg Intravenous $Given 2/6/24 0012)   ketorolac (TORADOL) injection 15 mg (15 mg Intravenous $Given 2/6/24 0136)       Assessments & Plan (with Medical Decision Making)     I have reviewed the nursing notes.    I have reviewed the findings, diagnosis, plan and need for follow up with the patient.      Medical Decision Making  Harrison Raymond is a 33 year old female who has a history of migraine headaches who presents for evaluation of headache.  Patient was seen at [facility] yesterday for the same thing and had a full workup.  Vital signs notable for mild hypertension, otherwise reassuring.  Patient has a completely normal neurological exam.  She was seen at [facility] yesterday for the same thing.  She had a full workup that was all reassuring.  She had a negative head CT, CT PE study and negative labs.  I do not think we need to repeat any of these today as there is been no changes in her headache.  Suspect likely primary headache disorder such as migraine.  She reports only mild improvement while at the hospital yesterday.  She got droperidol and did not really tolerate this.  She states that the last time she was here she got good relief.  I reviewed her hospital visit here from June 2020 and she was treated with Reglan, Benadryl, fluids and Toradol and Decadron.      Patient reassessed.  She is feeling better, but continues to have to endorse a headache.  She states headache is worse with moving.  Will give  additional medication.    Patient was reassessed.  She is feeling better and she is requesting discharge.  She states this is the best she is felt 9 days.  She was able to get up and use the bathroom.  She states she still has a very mild headache, but she feels that it is tolerable.  She states she feels better than when she left the hospital yesterday.  She is wondering about getting back on sumatriptan.  She states she had previously been on this and thought it was helpful but she had stopped taking it because it caused her to have a tingling sensation.  She states she has since learned that this is a common side effect and she is interested in restarting it.  Her mom is on it and gets a lot of relief from it.  At her request, she is prescribed sumatriptan.  She has seen neurology in the past, but has not seen them for quite a while and she is interested in a referral to neurology.  Neurology referral given.  I also recommended she follow-up with her regular doctor.  Anticipatory guidance discussed.  Return precautions discussed.  All questions answered.  Patient discharged in stable condition.        Discharge Medication List as of 2/6/2024  2:48 AM        START taking these medications    Details   SUMAtriptan (IMITREX) 50 MG tablet Take 1 tablet (50 mg) by mouth at onset of headache for migraine May repeat in 2 hours. Max 4 tablets/24 hours., Disp-20 tablet, R-0, E-Prescribe             Final diagnoses:   Acute nonintractable headache, unspecified headache type       2/5/2024   [facility] EMERGENCY DEPT       Pan Mendenhall MD  02/06/24 2575

## 2024-02-06 NOTE — DISCHARGE INSTRUCTIONS
Your symptoms are likely due to a migraine headache.  You were given various medications here in the ER with improvement in symptoms.  You have been prescribed a medication called sumatriptan.  This medication works best when used early on in a headache.  This medication may give you a tingling sensation and that can be normal.  You can also use Tylenol or ibuprofen.  You have been referred to follow-up with neurology.  You should also follow-up with your regular doctor.  Get plenty of rest and drink lots of fluids.  Return to the ER with new or worsening symptoms.